# Patient Record
Sex: MALE | Race: WHITE | NOT HISPANIC OR LATINO | Employment: OTHER | URBAN - METROPOLITAN AREA
[De-identification: names, ages, dates, MRNs, and addresses within clinical notes are randomized per-mention and may not be internally consistent; named-entity substitution may affect disease eponyms.]

---

## 2023-04-18 DIAGNOSIS — K21.9 GASTROESOPHAGEAL REFLUX DISEASE WITHOUT ESOPHAGITIS: ICD-10-CM

## 2023-04-18 RX ORDER — ESOMEPRAZOLE MAGNESIUM 40 MG/1
CAPSULE, DELAYED RELEASE ORAL
Qty: 90 CAPSULE | Refills: 0 | Status: SHIPPED | OUTPATIENT
Start: 2023-04-18 | End: 2023-07-18

## 2023-04-18 RX ORDER — ESOMEPRAZOLE MAGNESIUM 40 MG/1
40 CAPSULE, DELAYED RELEASE ORAL DAILY
COMMUNITY
End: 2023-04-18 | Stop reason: SDUPTHER

## 2023-04-18 RX ORDER — ESOMEPRAZOLE MAGNESIUM 40 MG/1
40 CAPSULE, DELAYED RELEASE ORAL DAILY
Qty: 90 CAPSULE | Refills: 0 | Status: SHIPPED | OUTPATIENT
Start: 2023-04-18 | End: 2023-04-18

## 2023-05-09 ENCOUNTER — OFFICE VISIT (OUTPATIENT)
Dept: PRIMARY CARE | Facility: CLINIC | Age: 63
End: 2023-05-09
Payer: COMMERCIAL

## 2023-05-09 VITALS
BODY MASS INDEX: 36.73 KG/M2 | SYSTOLIC BLOOD PRESSURE: 130 MMHG | HEIGHT: 75 IN | TEMPERATURE: 97.9 F | RESPIRATION RATE: 16 BRPM | DIASTOLIC BLOOD PRESSURE: 74 MMHG | HEART RATE: 76 BPM | WEIGHT: 295.4 LBS

## 2023-05-09 DIAGNOSIS — Z12.5 PROSTATE CANCER SCREENING: ICD-10-CM

## 2023-05-09 DIAGNOSIS — I10 BENIGN ESSENTIAL HYPERTENSION: ICD-10-CM

## 2023-05-09 DIAGNOSIS — Z00.00 HEALTHCARE MAINTENANCE: Primary | ICD-10-CM

## 2023-05-09 DIAGNOSIS — E78.5 HYPERLIPIDEMIA, UNSPECIFIED HYPERLIPIDEMIA TYPE: ICD-10-CM

## 2023-05-09 DIAGNOSIS — Z00.00 ROUTINE GENERAL MEDICAL EXAMINATION AT A HEALTH CARE FACILITY: ICD-10-CM

## 2023-05-09 PROBLEM — M51.9 LUMBAR DISC DISEASE: Status: ACTIVE | Noted: 2023-05-09

## 2023-05-09 PROBLEM — K21.9 GERD (GASTROESOPHAGEAL REFLUX DISEASE): Status: ACTIVE | Noted: 2023-05-09

## 2023-05-09 PROBLEM — I82.431 ACUTE DEEP VEIN THROMBOSIS (DVT) OF POPLITEAL VEIN OF RIGHT LOWER EXTREMITY (MULTI): Status: ACTIVE | Noted: 2023-05-09

## 2023-05-09 PROBLEM — D36.9 TUBULAR ADENOMA: Status: ACTIVE | Noted: 2023-05-09

## 2023-05-09 PROBLEM — K76.0 FATTY LIVER: Status: ACTIVE | Noted: 2023-05-09

## 2023-05-09 PROBLEM — K27.9 PUD (PEPTIC ULCER DISEASE): Status: ACTIVE | Noted: 2023-05-09

## 2023-05-09 PROCEDURE — 3075F SYST BP GE 130 - 139MM HG: CPT | Performed by: FAMILY MEDICINE

## 2023-05-09 PROCEDURE — 3078F DIAST BP <80 MM HG: CPT | Performed by: FAMILY MEDICINE

## 2023-05-09 PROCEDURE — 1036F TOBACCO NON-USER: CPT | Performed by: FAMILY MEDICINE

## 2023-05-09 PROCEDURE — 99214 OFFICE O/P EST MOD 30 MIN: CPT | Performed by: FAMILY MEDICINE

## 2023-05-09 RX ORDER — LISINOPRIL 20 MG/1
20 TABLET ORAL DAILY
COMMUNITY
End: 2023-11-14 | Stop reason: SDUPTHER

## 2023-05-09 RX ORDER — ATENOLOL 50 MG/1
50 TABLET ORAL 2 TIMES DAILY
COMMUNITY
End: 2023-06-22 | Stop reason: SDUPTHER

## 2023-05-09 RX ORDER — ATORVASTATIN CALCIUM 20 MG/1
20 TABLET, FILM COATED ORAL DAILY
COMMUNITY
End: 2023-10-23 | Stop reason: SDUPTHER

## 2023-05-09 ASSESSMENT — ENCOUNTER SYMPTOMS: HYPERTENSION: 1

## 2023-05-09 NOTE — PROGRESS NOTES
"Subjective   Patient ID: Giovani Spaulding is a 62 y.o. male who presents for Hypertension (6 month HTN recheck . No concerns today ).    Hypertension  This is a chronic problem. The problem is controlled. There are no compliance problems.         Review of Systems    Objective   /74   Pulse 76   Temp 36.6 °C (97.9 °F)   Resp 16   Ht 1.905 m (6' 3\")   Wt 134 kg (295 lb 6.4 oz)   BMI 36.92 kg/m²     Physical Exam  Vitals and nursing note reviewed.   Constitutional:       General: He is not in acute distress.     Appearance: He is not ill-appearing.   HENT:      Head: Normocephalic and atraumatic.      Mouth/Throat:      Mouth: Mucous membranes are moist.   Eyes:      Conjunctiva/sclera: Conjunctivae normal.   Cardiovascular:      Rate and Rhythm: Normal rate and regular rhythm.      Heart sounds: Normal heart sounds.   Pulmonary:      Effort: Pulmonary effort is normal.      Breath sounds: Normal breath sounds.   Skin:     General: Skin is warm.   Neurological:      Mental Status: He is alert.   Psychiatric:         Mood and Affect: Mood normal.         Thought Content: Thought content normal.         Judgment: Judgment normal.         Assessment/Plan   Problem List Items Addressed This Visit          Circulatory    Benign essential hypertension    Relevant Orders    CBC    Comprehensive Metabolic Panel       Other    Hyperlipidemia    Relevant Orders    Lipid Panel     Other Visit Diagnoses       Healthcare maintenance    -  Primary    Relevant Orders    CT cardiac scoring wo IV contrast    Prostate cancer screening        Relevant Orders    Prostate Specific Antigen    Routine general medical examination at a health care facility        Relevant Orders    Follow Up In [facility] - PCP               "

## 2023-06-22 DIAGNOSIS — I10 BENIGN ESSENTIAL HYPERTENSION: Primary | ICD-10-CM

## 2023-06-23 RX ORDER — ATENOLOL 50 MG/1
50 TABLET ORAL 2 TIMES DAILY
Qty: 180 TABLET | Refills: 0 | Status: SHIPPED | OUTPATIENT
Start: 2023-06-23 | End: 2023-09-19

## 2023-07-18 DIAGNOSIS — K21.9 GASTROESOPHAGEAL REFLUX DISEASE WITHOUT ESOPHAGITIS: ICD-10-CM

## 2023-07-18 RX ORDER — ESOMEPRAZOLE MAGNESIUM 40 MG/1
CAPSULE, DELAYED RELEASE ORAL
Qty: 90 CAPSULE | Refills: 1 | Status: SHIPPED | OUTPATIENT
Start: 2023-07-18 | End: 2024-01-24

## 2023-09-19 DIAGNOSIS — I10 BENIGN ESSENTIAL HYPERTENSION: ICD-10-CM

## 2023-09-19 RX ORDER — ATENOLOL 50 MG/1
50 TABLET ORAL 2 TIMES DAILY
Qty: 180 TABLET | Refills: 0 | Status: SHIPPED | OUTPATIENT
Start: 2023-09-19 | End: 2023-12-18

## 2023-10-23 DIAGNOSIS — E78.5 HYPERLIPIDEMIA, UNSPECIFIED HYPERLIPIDEMIA TYPE: Primary | ICD-10-CM

## 2023-10-23 RX ORDER — ATORVASTATIN CALCIUM 20 MG/1
20 TABLET, FILM COATED ORAL DAILY
Qty: 90 TABLET | Refills: 1 | Status: SHIPPED | OUTPATIENT
Start: 2023-10-23 | End: 2024-04-18

## 2023-11-14 DIAGNOSIS — I10 BENIGN ESSENTIAL HYPERTENSION: Primary | ICD-10-CM

## 2023-11-14 RX ORDER — LISINOPRIL 20 MG/1
20 TABLET ORAL DAILY
Qty: 90 TABLET | Refills: 1 | Status: SHIPPED | OUTPATIENT
Start: 2023-11-14 | End: 2024-04-29 | Stop reason: SDUPTHER

## 2023-12-16 DIAGNOSIS — I10 BENIGN ESSENTIAL HYPERTENSION: ICD-10-CM

## 2023-12-18 RX ORDER — ATENOLOL 50 MG/1
50 TABLET ORAL 2 TIMES DAILY
Qty: 180 TABLET | Refills: 0 | Status: SHIPPED | OUTPATIENT
Start: 2023-12-18 | End: 2024-03-11

## 2024-01-24 DIAGNOSIS — K21.9 GASTROESOPHAGEAL REFLUX DISEASE WITHOUT ESOPHAGITIS: ICD-10-CM

## 2024-01-24 RX ORDER — ESOMEPRAZOLE MAGNESIUM 40 MG/1
CAPSULE, DELAYED RELEASE ORAL
Qty: 90 CAPSULE | Refills: 0 | Status: SHIPPED | OUTPATIENT
Start: 2024-01-24 | End: 2024-04-30 | Stop reason: SDUPTHER

## 2024-04-17 DIAGNOSIS — E78.5 HYPERLIPIDEMIA, UNSPECIFIED HYPERLIPIDEMIA TYPE: ICD-10-CM

## 2024-04-18 RX ORDER — ATORVASTATIN CALCIUM 20 MG/1
20 TABLET, FILM COATED ORAL DAILY
Qty: 30 TABLET | Refills: 0 | Status: SHIPPED | OUTPATIENT
Start: 2024-04-18 | End: 2024-04-30 | Stop reason: SDUPTHER

## 2024-04-29 DIAGNOSIS — I10 BENIGN ESSENTIAL HYPERTENSION: ICD-10-CM

## 2024-04-29 RX ORDER — LISINOPRIL 20 MG/1
20 TABLET ORAL DAILY
Qty: 90 TABLET | Refills: 1 | OUTPATIENT
Start: 2024-04-29

## 2024-04-29 RX ORDER — LISINOPRIL 20 MG/1
20 TABLET ORAL DAILY
Qty: 30 TABLET | Refills: 0 | Status: SHIPPED | OUTPATIENT
Start: 2024-04-29 | End: 2024-04-30 | Stop reason: SDUPTHER

## 2024-04-30 ENCOUNTER — OFFICE VISIT (OUTPATIENT)
Dept: PRIMARY CARE | Facility: CLINIC | Age: 64
End: 2024-04-30
Payer: MEDICARE

## 2024-04-30 VITALS
BODY MASS INDEX: 36.8 KG/M2 | DIASTOLIC BLOOD PRESSURE: 64 MMHG | WEIGHT: 296 LBS | RESPIRATION RATE: 18 BRPM | HEIGHT: 75 IN | SYSTOLIC BLOOD PRESSURE: 142 MMHG | OXYGEN SATURATION: 95 % | HEART RATE: 61 BPM

## 2024-04-30 DIAGNOSIS — Z00.00 ANNUAL PHYSICAL EXAM: ICD-10-CM

## 2024-04-30 DIAGNOSIS — K21.9 GASTROESOPHAGEAL REFLUX DISEASE WITHOUT ESOPHAGITIS: ICD-10-CM

## 2024-04-30 DIAGNOSIS — I10 BENIGN ESSENTIAL HYPERTENSION: ICD-10-CM

## 2024-04-30 DIAGNOSIS — E78.5 HYPERLIPIDEMIA, UNSPECIFIED HYPERLIPIDEMIA TYPE: ICD-10-CM

## 2024-04-30 DIAGNOSIS — Z12.5 ENCOUNTER FOR SCREENING FOR MALIGNANT NEOPLASM OF PROSTATE: Primary | ICD-10-CM

## 2024-04-30 PROCEDURE — 3077F SYST BP >= 140 MM HG: CPT | Performed by: NURSE PRACTITIONER

## 2024-04-30 PROCEDURE — 99396 PREV VISIT EST AGE 40-64: CPT | Performed by: NURSE PRACTITIONER

## 2024-04-30 PROCEDURE — 3078F DIAST BP <80 MM HG: CPT | Performed by: NURSE PRACTITIONER

## 2024-04-30 RX ORDER — ATENOLOL 50 MG/1
50 TABLET ORAL 2 TIMES DAILY
Qty: 180 TABLET | Refills: 11 | Status: SHIPPED | OUTPATIENT
Start: 2024-04-30

## 2024-04-30 RX ORDER — LISINOPRIL 20 MG/1
20 TABLET ORAL DAILY
Qty: 30 TABLET | Refills: 11 | Status: SHIPPED | OUTPATIENT
Start: 2024-04-30

## 2024-04-30 RX ORDER — ATORVASTATIN CALCIUM 20 MG/1
20 TABLET, FILM COATED ORAL DAILY
Qty: 30 TABLET | Refills: 11 | Status: SHIPPED | OUTPATIENT
Start: 2024-04-30 | End: 2025-04-30

## 2024-04-30 RX ORDER — ESOMEPRAZOLE MAGNESIUM 40 MG/1
40 CAPSULE, DELAYED RELEASE ORAL DAILY
Qty: 90 CAPSULE | Refills: 3 | Status: SHIPPED | OUTPATIENT
Start: 2024-04-30

## 2024-04-30 RX ORDER — COLCHICINE 0.6 MG/1
0.6 TABLET ORAL
COMMUNITY
Start: 2023-08-22 | End: 2024-04-30 | Stop reason: WASHOUT

## 2024-04-30 RX ORDER — INDOMETHACIN 50 MG/1
50 CAPSULE ORAL 3 TIMES DAILY
COMMUNITY
Start: 2023-08-21 | End: 2024-04-30 | Stop reason: ALTCHOICE

## 2024-04-30 ASSESSMENT — ENCOUNTER SYMPTOMS
NERVOUS/ANXIOUS: 0
FEVER: 0
NAUSEA: 0
CONSTIPATION: 0
FATIGUE: 0
WEAKNESS: 0
AGITATION: 0
SHORTNESS OF BREATH: 0
COUGH: 0
SLEEP DISTURBANCE: 0
DIARRHEA: 0
VOMITING: 0

## 2024-04-30 NOTE — PROGRESS NOTES
"Subjective   Patient ID: Giovani Spaulding is a 63 y.o. male who presents for New Patient Visit.    Her to establish care, medication refills and yearly exam, he is former patient of Dr Myrick. He is feeling well and up to date on his colonoscopy, he did not get blood work last year and is due for some this year. His BP is controlled on current medication.  He can follow up 1 yr or as needed         Review of Systems   Constitutional:  Negative for fatigue and fever.   Respiratory:  Negative for cough and shortness of breath.    Cardiovascular:  Negative for chest pain and leg swelling.   Gastrointestinal:  Negative for constipation, diarrhea, nausea and vomiting.   Skin:  Negative for pallor and rash.   Neurological:  Negative for weakness.   Psychiatric/Behavioral:  Negative for agitation, behavioral problems and sleep disturbance. The patient is not nervous/anxious.        Objective   /64   Pulse 61   Resp 18   Ht 1.905 m (6' 3\")   Wt 134 kg (296 lb)   SpO2 95%   BMI 37.00 kg/m²     Physical Exam  Vitals and nursing note reviewed.   Constitutional:       General: He is not in acute distress.  HENT:      Head: Normocephalic and atraumatic.   Eyes:      Pupils: Pupils are equal, round, and reactive to light.   Cardiovascular:      Rate and Rhythm: Regular rhythm. Bradycardia present.   Pulmonary:      Effort: Pulmonary effort is normal.      Breath sounds: Normal breath sounds.   Skin:     General: Skin is warm and dry.   Neurological:      Mental Status: He is alert and oriented to person, place, and time.   Psychiatric:         Mood and Affect: Mood normal.         Assessment/Plan   Problem List Items Addressed This Visit             ICD-10-CM    Benign essential hypertension I10    Relevant Medications    atenolol (Tenormin) 50 mg tablet    lisinopril 20 mg tablet    Other Relevant Orders    CBC    Comprehensive Metabolic Panel    Hemoglobin A1C    Lipid Panel    GERD (gastroesophageal reflux " disease) K21.9    Relevant Medications    esomeprazole (NexIUM) 40 mg DR capsule    Hyperlipidemia E78.5    Relevant Medications    atorvastatin (Lipitor) 20 mg tablet    Other Relevant Orders    Lipid Panel     Other Visit Diagnoses         Codes    Encounter for screening for malignant neoplasm of prostate    -  Primary Z12.5    Relevant Orders    Prostate Specific Antigen    Annual physical exam     Z00.00

## 2024-06-04 ENCOUNTER — APPOINTMENT (OUTPATIENT)
Dept: PRIMARY CARE | Facility: CLINIC | Age: 64
End: 2024-06-04
Payer: MEDICARE

## 2024-06-07 ENCOUNTER — TELEPHONE (OUTPATIENT)
Dept: SCHEDULING | Age: 64
End: 2024-06-07
Payer: MEDICARE

## 2024-06-07 NOTE — TELEPHONE ENCOUNTER
Patient left a voicemail requesting an earlier appointment than June 26th due to recent significant concerns with sleep apnea.  Patient stated he is waking up several times a night unable to breath.    Informed patient he would get a call if there are any cancellations.

## 2024-06-11 ENCOUNTER — LAB (OUTPATIENT)
Dept: LAB | Facility: LAB | Age: 64
End: 2024-06-11
Payer: MEDICARE

## 2024-06-11 DIAGNOSIS — E78.5 HYPERLIPIDEMIA, UNSPECIFIED HYPERLIPIDEMIA TYPE: ICD-10-CM

## 2024-06-11 DIAGNOSIS — Z11.4 ENCOUNTER FOR SCREENING FOR HIV: ICD-10-CM

## 2024-06-11 DIAGNOSIS — I10 BENIGN ESSENTIAL HYPERTENSION: ICD-10-CM

## 2024-06-11 DIAGNOSIS — Z12.5 ENCOUNTER FOR SCREENING FOR MALIGNANT NEOPLASM OF PROSTATE: ICD-10-CM

## 2024-06-11 LAB
ALBUMIN SERPL BCP-MCNC: 4 G/DL (ref 3.4–5)
ALP SERPL-CCNC: 54 U/L (ref 33–136)
ALT SERPL W P-5'-P-CCNC: 52 U/L (ref 10–52)
ANION GAP SERPL CALC-SCNC: 11 MMOL/L (ref 10–20)
AST SERPL W P-5'-P-CCNC: 28 U/L (ref 9–39)
BILIRUB SERPL-MCNC: 0.9 MG/DL (ref 0–1.2)
BUN SERPL-MCNC: 11 MG/DL (ref 6–23)
CALCIUM SERPL-MCNC: 8.7 MG/DL (ref 8.6–10.3)
CHLORIDE SERPL-SCNC: 106 MMOL/L (ref 98–107)
CHOLEST SERPL-MCNC: 115 MG/DL (ref 0–199)
CHOLESTEROL/HDL RATIO: 2.8
CO2 SERPL-SCNC: 29 MMOL/L (ref 21–32)
CREAT SERPL-MCNC: 0.96 MG/DL (ref 0.5–1.3)
EGFRCR SERPLBLD CKD-EPI 2021: 89 ML/MIN/1.73M*2
ERYTHROCYTE [DISTWIDTH] IN BLOOD BY AUTOMATED COUNT: 13.5 % (ref 11.5–14.5)
EST. AVERAGE GLUCOSE BLD GHB EST-MCNC: 117 MG/DL
GLUCOSE SERPL-MCNC: 104 MG/DL (ref 74–99)
HBA1C MFR BLD: 5.7 %
HCT VFR BLD AUTO: 42 % (ref 41–52)
HDLC SERPL-MCNC: 40.6 MG/DL
HGB BLD-MCNC: 13.8 G/DL (ref 13.5–17.5)
LDLC SERPL CALC-MCNC: 54 MG/DL
MCH RBC QN AUTO: 31.9 PG (ref 26–34)
MCHC RBC AUTO-ENTMCNC: 32.9 G/DL (ref 32–36)
MCV RBC AUTO: 97 FL (ref 80–100)
NON HDL CHOLESTEROL: 74 MG/DL (ref 0–149)
NRBC BLD-RTO: 0 /100 WBCS (ref 0–0)
PLATELET # BLD AUTO: 208 X10*3/UL (ref 150–450)
POTASSIUM SERPL-SCNC: 4.5 MMOL/L (ref 3.5–5.3)
PROT SERPL-MCNC: 6.3 G/DL (ref 6.4–8.2)
PSA SERPL-MCNC: 0.41 NG/ML
RBC # BLD AUTO: 4.33 X10*6/UL (ref 4.5–5.9)
SODIUM SERPL-SCNC: 141 MMOL/L (ref 136–145)
TRIGL SERPL-MCNC: 100 MG/DL (ref 0–149)
VLDL: 20 MG/DL (ref 0–40)
WBC # BLD AUTO: 9.9 X10*3/UL (ref 4.4–11.3)

## 2024-06-11 PROCEDURE — 87389 HIV-1 AG W/HIV-1&-2 AB AG IA: CPT

## 2024-06-11 PROCEDURE — 84153 ASSAY OF PSA TOTAL: CPT

## 2024-06-11 PROCEDURE — 80053 COMPREHEN METABOLIC PANEL: CPT

## 2024-06-11 PROCEDURE — 85027 COMPLETE CBC AUTOMATED: CPT

## 2024-06-11 PROCEDURE — 80061 LIPID PANEL: CPT

## 2024-06-11 PROCEDURE — 83036 HEMOGLOBIN GLYCOSYLATED A1C: CPT

## 2024-06-11 PROCEDURE — 36415 COLL VENOUS BLD VENIPUNCTURE: CPT

## 2024-06-12 ENCOUNTER — TELEPHONE (OUTPATIENT)
Dept: PRIMARY CARE | Facility: CLINIC | Age: 64
End: 2024-06-12
Payer: MEDICARE

## 2024-06-12 NOTE — RESULT ENCOUNTER NOTE
Blood work all looks good, no need for any changes. Your A1C (indicator for Diabetes)is a little elevated at 5.7 should be under 5.6. Improving your diet and exercise can help

## 2024-06-12 NOTE — TELEPHONE ENCOUNTER
----- Message from IVANA Mo sent at 6/12/2024  8:08 AM EDT -----  Blood work all looks good, no need for any changes. Your A1C (indicator for Diabetes)is a little elevated at 5.7 should be under 5.6. Improving your diet and exercise can help

## 2024-06-14 ENCOUNTER — OFFICE VISIT (OUTPATIENT)
Dept: PRIMARY CARE | Facility: CLINIC | Age: 64
End: 2024-06-14
Payer: COMMERCIAL

## 2024-06-14 VITALS
HEIGHT: 75 IN | HEART RATE: 61 BPM | WEIGHT: 301.6 LBS | SYSTOLIC BLOOD PRESSURE: 136 MMHG | TEMPERATURE: 97.6 F | BODY MASS INDEX: 37.5 KG/M2 | RESPIRATION RATE: 16 BRPM | OXYGEN SATURATION: 96 % | DIASTOLIC BLOOD PRESSURE: 82 MMHG

## 2024-06-14 DIAGNOSIS — K76.0 FATTY LIVER: ICD-10-CM

## 2024-06-14 DIAGNOSIS — E66.01 CLASS 2 SEVERE OBESITY DUE TO EXCESS CALORIES WITH SERIOUS COMORBIDITY AND BODY MASS INDEX (BMI) OF 37.0 TO 37.9 IN ADULT (MULTI): ICD-10-CM

## 2024-06-14 DIAGNOSIS — K21.9 GASTROESOPHAGEAL REFLUX DISEASE WITHOUT ESOPHAGITIS: ICD-10-CM

## 2024-06-14 DIAGNOSIS — E78.2 MIXED HYPERLIPIDEMIA: ICD-10-CM

## 2024-06-14 DIAGNOSIS — I10 BENIGN ESSENTIAL HYPERTENSION: ICD-10-CM

## 2024-06-14 DIAGNOSIS — Z11.4 ENCOUNTER FOR SCREENING FOR HIV: Primary | ICD-10-CM

## 2024-06-14 PROBLEM — E66.812 CLASS 2 SEVERE OBESITY DUE TO EXCESS CALORIES WITH SERIOUS COMORBIDITY AND BODY MASS INDEX (BMI) OF 37.0 TO 37.9 IN ADULT: Status: ACTIVE | Noted: 2024-06-14

## 2024-06-14 LAB — HIV 1+2 AB+HIV1 P24 AG SERPL QL IA: NONREACTIVE

## 2024-06-14 PROCEDURE — 3075F SYST BP GE 130 - 139MM HG: CPT | Performed by: INTERNAL MEDICINE

## 2024-06-14 PROCEDURE — 1036F TOBACCO NON-USER: CPT | Performed by: INTERNAL MEDICINE

## 2024-06-14 PROCEDURE — 99213 OFFICE O/P EST LOW 20 MIN: CPT | Performed by: INTERNAL MEDICINE

## 2024-06-14 PROCEDURE — 3008F BODY MASS INDEX DOCD: CPT | Performed by: INTERNAL MEDICINE

## 2024-06-14 PROCEDURE — 3079F DIAST BP 80-89 MM HG: CPT | Performed by: INTERNAL MEDICINE

## 2024-06-14 ASSESSMENT — PAIN SCALES - GENERAL: PAINLEVEL: 7

## 2024-06-14 NOTE — PROGRESS NOTES
"Subjective   Giovani Spaulding is a 63 y.o. male who presents for Transfer Of Care (Previous pcp was Dr. Myrick ).  Review labs       Patients previous pcp was Dr. Myrick and seen Valentine Johnson for his physical back in April 2024   Patient does check BP at home occasional, readings are normally high 130s/ 80s  Patient is having left knee pain, he is seeing an orthopedic later today for it.    Seeing spine surgery Dr. Mahoney at [facility].  Ha shad surgery x 3 for herniated discs.          Review of Systems   All other systems reviewed and are negative.      Objective   /82   Pulse 61   Temp 36.4 °C (97.6 °F)   Resp 16   Ht 1.905 m (6' 3\")   Wt 137 kg (301 lb 9.6 oz)   SpO2 96%   BMI 37.70 kg/m²       Physical Exam  Constitutional:       Appearance: Normal appearance.   HENT:      Head: Normocephalic.   Eyes:      Conjunctiva/sclera: Conjunctivae normal.   Cardiovascular:      Rate and Rhythm: Normal rate and regular rhythm.      Heart sounds: Normal heart sounds.   Pulmonary:      Effort: Pulmonary effort is normal.      Breath sounds: Normal breath sounds.   Musculoskeletal:      Cervical back: Neck supple.   Skin:     General: Skin is warm and dry.   Neurological:      Mental Status: He is alert.         Assessment/Plan   Problem List Items Addressed This Visit       Benign essential hypertension    Fatty liver     Dr. Dailey initiated with elevated liver enzymes per patient.    No documented imaging.          Relevant Orders    Follow Up In [facility] - PCP - Established    GERD (gastroesophageal reflux disease)    Hyperlipidemia    Class 2 severe obesity due to excess calories with serious comorbidity and body mass index (BMI) of 37.0 to 37.9 in adult (Multi)     I encourage you to stay active and healthy, and to follow these healthy habits:     Try to increase your intake of fish such as salmon and tuna and try to get 2 to 3 servings of fish per week.  Increase your intake of plant-based " protein listed here:    Unprocessed nuts, walnuts, or almonds, Nuts and Seeds.      Green vegetables such as Broccoli, Peas, Greens, Spinach    Beans, Chickpeas, & Lentils    Other sources include Potatoes, Quinoa, Seaweed, Soymilk, Tempeh, and Tofu.  Increase food s higher in flavonoids found in black tea, green tea, apples, nuts, citrus fruit, berries, and dark chocolate.  You should avoid fried foods, and sugary or starchy foods and sugary drinks, and void saturated fats.    Try not to dine at restaurants more than once per month, and avoid fast food restaurants.      Try to get restful sleep approximately 7-9 hours every night.  Work towards getting 30 minutes of  moderate intensity exercise 4 to 5 days per week.  You should also try to exercise at least one hour per day with light walking.            Relevant Orders    Follow Up In [facility] - PCP - Established     Other Visit Diagnoses       Encounter for screening for HIV    -  Primary    Relevant Orders    HIV 1/2 Antigen/Antibody Screen with Reflex to Confirmation          Encounter Diagnoses   Name Primary?    Encounter for screening for HIV Yes    Mixed hyperlipidemia     Benign essential hypertension     Gastroesophageal reflux disease without esophagitis     Fatty liver     Class 2 severe obesity due to excess calories with serious comorbidity and body mass index (BMI) of 37.0 to 37.9 in adult (Multi)      This is a  patient transferring care to me.  Past medical history reviewed, and discussed with patient, Medicaitons reviewed and renewed as needed, and Gaps in Care addressed as time allowed.      Johnathon Alejo, DO

## 2024-06-14 NOTE — ASSESSMENT & PLAN NOTE
I encourage you to stay active and healthy, and to follow these healthy habits:     Try to increase your intake of fish such as salmon and tuna and try to get 2 to 3 servings of fish per week.  Increase your intake of plant-based protein listed here:    Unprocessed nuts, walnuts, or almonds, Nuts and Seeds.      Green vegetables such as Broccoli, Peas, Greens, Spinach    Beans, Chickpeas, & Lentils    Other sources include Potatoes, Quinoa, Seaweed, Soymilk, Tempeh, and Tofu.  Increase food s higher in flavonoids found in black tea, green tea, apples, nuts, citrus fruit, berries, and dark chocolate.  You should avoid fried foods, and sugary or starchy foods and sugary drinks, and void saturated fats.    Try not to dine at restaurants more than once per month, and avoid fast food restaurants.      Try to get restful sleep approximately 7-9 hours every night.  Work towards getting 30 minutes of  moderate intensity exercise 4 to 5 days per week.  You should also try to exercise at least one hour per day with light walking.

## 2024-06-26 ENCOUNTER — APPOINTMENT (OUTPATIENT)
Dept: PRIMARY CARE | Facility: CLINIC | Age: 64
End: 2024-06-26
Payer: MEDICARE

## 2024-08-07 DIAGNOSIS — I10 BENIGN ESSENTIAL HYPERTENSION: ICD-10-CM

## 2024-08-07 DIAGNOSIS — E78.5 HYPERLIPIDEMIA, UNSPECIFIED HYPERLIPIDEMIA TYPE: ICD-10-CM

## 2024-08-07 RX ORDER — ATENOLOL 50 MG/1
50 TABLET ORAL 2 TIMES DAILY
Qty: 180 TABLET | Refills: 3 | Status: SHIPPED | OUTPATIENT
Start: 2024-08-07

## 2024-08-07 RX ORDER — ATORVASTATIN CALCIUM 20 MG/1
20 TABLET, FILM COATED ORAL DAILY
Qty: 90 TABLET | Refills: 3 | Status: SHIPPED | OUTPATIENT
Start: 2024-08-07 | End: 2025-08-02

## 2024-08-12 DIAGNOSIS — K21.9 GASTROESOPHAGEAL REFLUX DISEASE WITHOUT ESOPHAGITIS: ICD-10-CM

## 2024-08-12 DIAGNOSIS — I10 BENIGN ESSENTIAL HYPERTENSION: ICD-10-CM

## 2024-08-12 RX ORDER — ESOMEPRAZOLE MAGNESIUM 40 MG/1
40 CAPSULE, DELAYED RELEASE ORAL DAILY
Qty: 90 CAPSULE | Refills: 3 | Status: SHIPPED | OUTPATIENT
Start: 2024-08-12

## 2024-08-12 RX ORDER — ATENOLOL 50 MG/1
50 TABLET ORAL 2 TIMES DAILY
Qty: 180 TABLET | Refills: 3 | Status: SHIPPED | OUTPATIENT
Start: 2024-08-12

## 2024-08-12 NOTE — TELEPHONE ENCOUNTER
Approved refill request.  Please schedule follow up in the office.  
inability to maintain weight-bearing restrictions w/o assist/decreased weight-shifting ability

## 2024-09-16 ENCOUNTER — APPOINTMENT (OUTPATIENT)
Dept: PRIMARY CARE | Facility: CLINIC | Age: 64
End: 2024-09-16
Payer: MEDICARE

## 2024-09-16 VITALS
DIASTOLIC BLOOD PRESSURE: 100 MMHG | HEART RATE: 60 BPM | WEIGHT: 299 LBS | HEIGHT: 75 IN | OXYGEN SATURATION: 96 % | SYSTOLIC BLOOD PRESSURE: 162 MMHG | BODY MASS INDEX: 37.18 KG/M2 | RESPIRATION RATE: 16 BRPM | TEMPERATURE: 97.6 F

## 2024-09-16 DIAGNOSIS — Z23 NEED FOR INFLUENZA VACCINATION: ICD-10-CM

## 2024-09-16 DIAGNOSIS — I10 BENIGN ESSENTIAL HYPERTENSION: Primary | ICD-10-CM

## 2024-09-16 DIAGNOSIS — E66.01 CLASS 2 SEVERE OBESITY DUE TO EXCESS CALORIES WITH SERIOUS COMORBIDITY AND BODY MASS INDEX (BMI) OF 37.0 TO 37.9 IN ADULT: ICD-10-CM

## 2024-09-16 DIAGNOSIS — Z86.718 HISTORY OF DVT IN ADULTHOOD: ICD-10-CM

## 2024-09-16 DIAGNOSIS — Z00.00 WELL ADULT HEALTH CHECK: ICD-10-CM

## 2024-09-16 DIAGNOSIS — E78.2 MIXED HYPERLIPIDEMIA: ICD-10-CM

## 2024-09-16 PROCEDURE — 1036F TOBACCO NON-USER: CPT | Performed by: INTERNAL MEDICINE

## 2024-09-16 PROCEDURE — 3080F DIAST BP >= 90 MM HG: CPT | Performed by: INTERNAL MEDICINE

## 2024-09-16 PROCEDURE — 90656 IIV3 VACC NO PRSV 0.5 ML IM: CPT | Performed by: INTERNAL MEDICINE

## 2024-09-16 PROCEDURE — 3008F BODY MASS INDEX DOCD: CPT | Performed by: INTERNAL MEDICINE

## 2024-09-16 PROCEDURE — 90471 IMMUNIZATION ADMIN: CPT | Performed by: INTERNAL MEDICINE

## 2024-09-16 PROCEDURE — 3077F SYST BP >= 140 MM HG: CPT | Performed by: INTERNAL MEDICINE

## 2024-09-16 PROCEDURE — 99213 OFFICE O/P EST LOW 20 MIN: CPT | Performed by: INTERNAL MEDICINE

## 2024-09-16 RX ORDER — LISINOPRIL 40 MG/1
40 TABLET ORAL DAILY
Qty: 90 TABLET | Refills: 3 | Status: SHIPPED | OUTPATIENT
Start: 2024-09-16 | End: 2025-09-16

## 2024-09-16 ASSESSMENT — PAIN SCALES - GENERAL: PAINLEVEL: 0-NO PAIN

## 2024-09-16 ASSESSMENT — ENCOUNTER SYMPTOMS: HYPERTENSION: 1

## 2024-09-16 NOTE — PROGRESS NOTES
"Subjective   Giovani Spaulding is a 63 y.o. male who presents for Follow-up and Hypertension.    [facility] 9.14.2024   Patient would like his yearly flu vaccine   Patient occasionally checks BP at home and readings are around 135-148/70-80s      Hypertension  This is a recurrent problem. The current episode started more than 1 year ago. The problem is unchanged. There are no associated agents to hypertension. Risk factors for coronary artery disease include male gender. The current treatment provides mild improvement. There are no compliance problems.        Review of Systems   All other systems reviewed and are negative.      Objective   BP (!) 162/100 (BP Location: Right arm, Patient Position: Sitting)   Pulse 60   Temp 36.4 °C (97.6 °F)   Resp 16   Ht 1.905 m (6' 3\")   Wt 136 kg (299 lb)   SpO2 96%   BMI 37.37 kg/m²       Physical Exam  Constitutional:       Appearance: Normal appearance.   HENT:      Head: Normocephalic.   Eyes:      Conjunctiva/sclera: Conjunctivae normal.   Cardiovascular:      Rate and Rhythm: Normal rate and regular rhythm.      Heart sounds: Normal heart sounds.   Pulmonary:      Effort: Pulmonary effort is normal.      Breath sounds: Normal breath sounds.   Musculoskeletal:      Cervical back: Neck supple.   Skin:     General: Skin is warm and dry.   Neurological:      Mental Status: He is alert.       Assessment & Plan  Need for influenza vaccination    Orders:    Flu vaccine, trivalent, preservative free, age 6 months and greater (Fluraix/Fluzone/Flulaval)    Benign essential hypertension  Elevation today and will increase lisinopril to 40 mg daily.  Discussed signs of hypotension and to call if he has problems with medication.  Consider adding diuretic.  Orders:    lisinopril 40 mg tablet; Take 1 tablet (40 mg) by mouth once daily. Needs appointment to establish care with new primary for future refills    Basic Metabolic Panel; Future    Magnesium; Future    Class 2 severe obesity " due to excess calories with serious comorbidity and body mass index (BMI) of 37.0 to 37.9 in adult (Multi)  Discussed options with diet and exercise.  Consider option to add medication to assist and will discuss at next visit.       Mixed hyperlipidemia  Well controlled and reviewed last Lipid panel.         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  No recurrence.           Discussed influenza and COVID vvaccinations recommendations.  Follow up in 2 months or sooner should you have questions or difficulty with medication changes.      Johnathon Alejo, DO

## 2024-09-16 NOTE — ASSESSMENT & PLAN NOTE
Elevation today and will increase lisinopril to 40 mg daily.  Discussed signs of hypotension and to call if he has problems with medication.  Consider adding diuretic.  Orders:    lisinopril 40 mg tablet; Take 1 tablet (40 mg) by mouth once daily. Needs appointment to establish care with new primary for future refills    Basic Metabolic Panel; Future    Magnesium; Future

## 2024-09-16 NOTE — PATIENT INSTRUCTIONS
I encourage you to stay active and healthy, and to follow these healthy habits:     Try to increase your intake of fish such as salmon and tuna and try to get 2 to 3 servings of fish per week.  Increase your intake of plant-based protein listed here:    Unprocessed nuts, walnuts, or almonds, Nuts and Seeds.      Green vegetables such as Broccoli, Peas, Greens, Spinach    Beans, Chickpeas, & Lentils    Other sources include Potatoes, Quinoa, Seaweed, Soymilk, Tempeh, and Tofu.  Increase foods higher in flavonoids found in black tea, green tea, apples, nuts, citrus fruit, berries, and dark chocolate.  You should avoid fried foods, and sugary or starchy foods and sugary drinks, and void saturated fats.    Try not to dine at restaurants more than once per month, and avoid fast food restaurants.      Try to get restful sleep approximately 7-9 hours every night.  Work towards getting 30 minutes of  moderate intensity exercise 4 to 5 days per week.  You should also try to exercise at least one hour per day with light walking.

## 2024-09-16 NOTE — ASSESSMENT & PLAN NOTE
Discussed options with diet and exercise.  Consider option to add medication to assist and will discuss at next visit.

## 2024-11-13 ENCOUNTER — APPOINTMENT (OUTPATIENT)
Dept: PRIMARY CARE | Facility: CLINIC | Age: 64
End: 2024-11-13
Payer: MEDICARE

## 2025-01-04 ENCOUNTER — APPOINTMENT (OUTPATIENT)
Dept: CARDIOLOGY | Facility: HOSPITAL | Age: 65
DRG: 439 | End: 2025-01-04
Payer: COMMERCIAL

## 2025-01-04 ENCOUNTER — HOSPITAL ENCOUNTER (INPATIENT)
Facility: HOSPITAL | Age: 65
DRG: 439 | End: 2025-01-04
Attending: EMERGENCY MEDICINE | Admitting: STUDENT IN AN ORGANIZED HEALTH CARE EDUCATION/TRAINING PROGRAM
Payer: COMMERCIAL

## 2025-01-04 ENCOUNTER — APPOINTMENT (OUTPATIENT)
Dept: RADIOLOGY | Facility: HOSPITAL | Age: 65
DRG: 439 | End: 2025-01-04
Payer: COMMERCIAL

## 2025-01-04 DIAGNOSIS — K85.90 ACUTE PANCREATITIS, UNSPECIFIED COMPLICATION STATUS, UNSPECIFIED PANCREATITIS TYPE (HHS-HCC): Primary | ICD-10-CM

## 2025-01-04 LAB
ALBUMIN SERPL BCP-MCNC: 4 G/DL (ref 3.4–5)
ALP SERPL-CCNC: 53 U/L (ref 33–136)
ALT SERPL W P-5'-P-CCNC: 27 U/L (ref 10–52)
ANION GAP SERPL CALC-SCNC: 9 MMOL/L (ref 10–20)
APPEARANCE UR: CLEAR
AST SERPL W P-5'-P-CCNC: 19 U/L (ref 9–39)
BASOPHILS # BLD AUTO: 0.05 X10*3/UL (ref 0–0.1)
BASOPHILS NFR BLD AUTO: 0.4 %
BILIRUB SERPL-MCNC: 0.7 MG/DL (ref 0–1.2)
BILIRUB UR STRIP.AUTO-MCNC: NEGATIVE MG/DL
BUN SERPL-MCNC: 7 MG/DL (ref 6–23)
CALCIUM SERPL-MCNC: 9.3 MG/DL (ref 8.6–10.3)
CHLORIDE SERPL-SCNC: 98 MMOL/L (ref 98–107)
CHOLEST SERPL-MCNC: 135 MG/DL (ref 0–199)
CHOLESTEROL/HDL RATIO: 3
CO2 SERPL-SCNC: 30 MMOL/L (ref 21–32)
COLOR UR: COLORLESS
CREAT SERPL-MCNC: 0.85 MG/DL (ref 0.5–1.3)
EGFRCR SERPLBLD CKD-EPI 2021: >90 ML/MIN/1.73M*2
EOSINOPHIL # BLD AUTO: 0.31 X10*3/UL (ref 0–0.7)
EOSINOPHIL NFR BLD AUTO: 2.3 %
ERYTHROCYTE [DISTWIDTH] IN BLOOD BY AUTOMATED COUNT: 12.4 % (ref 11.5–14.5)
FLUAV RNA RESP QL NAA+PROBE: NOT DETECTED
FLUBV RNA RESP QL NAA+PROBE: NOT DETECTED
GLUCOSE SERPL-MCNC: 150 MG/DL (ref 74–99)
GLUCOSE UR STRIP.AUTO-MCNC: NORMAL MG/DL
HCT VFR BLD AUTO: 45.3 % (ref 41–52)
HDLC SERPL-MCNC: 45.2 MG/DL
HGB BLD-MCNC: 15.3 G/DL (ref 13.5–17.5)
IMM GRANULOCYTES # BLD AUTO: 0.04 X10*3/UL (ref 0–0.7)
IMM GRANULOCYTES NFR BLD AUTO: 0.3 % (ref 0–0.9)
KETONES UR STRIP.AUTO-MCNC: NEGATIVE MG/DL
LEUKOCYTE ESTERASE UR QL STRIP.AUTO: NEGATIVE
LIPASE SERPL-CCNC: 1321 U/L (ref 9–82)
LYMPHOCYTES # BLD AUTO: 1.42 X10*3/UL (ref 1.2–4.8)
LYMPHOCYTES NFR BLD AUTO: 10.3 %
MCH RBC QN AUTO: 31.3 PG (ref 26–34)
MCHC RBC AUTO-ENTMCNC: 33.8 G/DL (ref 32–36)
MCV RBC AUTO: 93 FL (ref 80–100)
MONOCYTES # BLD AUTO: 1.21 X10*3/UL (ref 0.1–1)
MONOCYTES NFR BLD AUTO: 8.8 %
NEUTROPHILS # BLD AUTO: 10.73 X10*3/UL (ref 1.2–7.7)
NEUTROPHILS NFR BLD AUTO: 77.9 %
NITRITE UR QL STRIP.AUTO: NEGATIVE
NON-HDL CHOLESTEROL: 90 MG/DL (ref 0–149)
NRBC BLD-RTO: 0 /100 WBCS (ref 0–0)
PH UR STRIP.AUTO: 7 [PH]
PLATELET # BLD AUTO: 263 X10*3/UL (ref 150–450)
POTASSIUM SERPL-SCNC: 3.9 MMOL/L (ref 3.5–5.3)
PROT SERPL-MCNC: 7.4 G/DL (ref 6.4–8.2)
PROT UR STRIP.AUTO-MCNC: NEGATIVE MG/DL
RBC # BLD AUTO: 4.89 X10*6/UL (ref 4.5–5.9)
RBC # UR STRIP.AUTO: NEGATIVE /UL
SARS-COV-2 RNA RESP QL NAA+PROBE: NOT DETECTED
SODIUM SERPL-SCNC: 133 MMOL/L (ref 136–145)
SP GR UR STRIP.AUTO: 1.04
UROBILINOGEN UR STRIP.AUTO-MCNC: NORMAL MG/DL
WBC # BLD AUTO: 13.8 X10*3/UL (ref 4.4–11.3)

## 2025-01-04 PROCEDURE — 96361 HYDRATE IV INFUSION ADD-ON: CPT

## 2025-01-04 PROCEDURE — 36415 COLL VENOUS BLD VENIPUNCTURE: CPT

## 2025-01-04 PROCEDURE — 82787 IGG 1 2 3 OR 4 EACH: CPT | Mod: STJLAB

## 2025-01-04 PROCEDURE — 99223 1ST HOSP IP/OBS HIGH 75: CPT

## 2025-01-04 PROCEDURE — 83690 ASSAY OF LIPASE: CPT | Performed by: EMERGENCY MEDICINE

## 2025-01-04 PROCEDURE — 87040 BLOOD CULTURE FOR BACTERIA: CPT | Mod: STJLAB

## 2025-01-04 PROCEDURE — 2500000004 HC RX 250 GENERAL PHARMACY W/ HCPCS (ALT 636 FOR OP/ED)

## 2025-01-04 PROCEDURE — 99285 EMERGENCY DEPT VISIT HI MDM: CPT | Mod: 25 | Performed by: EMERGENCY MEDICINE

## 2025-01-04 PROCEDURE — 87636 SARSCOV2 & INF A&B AMP PRB: CPT

## 2025-01-04 PROCEDURE — 2550000001 HC RX 255 CONTRASTS: Performed by: EMERGENCY MEDICINE

## 2025-01-04 PROCEDURE — 83036 HEMOGLOBIN GLYCOSYLATED A1C: CPT | Mod: STJLAB

## 2025-01-04 PROCEDURE — 74177 CT ABD & PELVIS W/CONTRAST: CPT | Performed by: RADIOLOGY

## 2025-01-04 PROCEDURE — 96375 TX/PRO/DX INJ NEW DRUG ADDON: CPT

## 2025-01-04 PROCEDURE — 85025 COMPLETE CBC W/AUTO DIFF WBC: CPT

## 2025-01-04 PROCEDURE — 2500000004 HC RX 250 GENERAL PHARMACY W/ HCPCS (ALT 636 FOR OP/ED): Performed by: EMERGENCY MEDICINE

## 2025-01-04 PROCEDURE — 83718 ASSAY OF LIPOPROTEIN: CPT

## 2025-01-04 PROCEDURE — 81003 URINALYSIS AUTO W/O SCOPE: CPT

## 2025-01-04 PROCEDURE — 93005 ELECTROCARDIOGRAM TRACING: CPT

## 2025-01-04 PROCEDURE — 80053 COMPREHEN METABOLIC PANEL: CPT

## 2025-01-04 PROCEDURE — 74177 CT ABD & PELVIS W/CONTRAST: CPT

## 2025-01-04 RX ORDER — ONDANSETRON HYDROCHLORIDE 2 MG/ML
4 INJECTION, SOLUTION INTRAVENOUS ONCE
Status: COMPLETED | OUTPATIENT
Start: 2025-01-04 | End: 2025-01-04

## 2025-01-04 RX ORDER — METRONIDAZOLE 500 MG/100ML
500 INJECTION, SOLUTION INTRAVENOUS EVERY 8 HOURS
Status: DISCONTINUED | OUTPATIENT
Start: 2025-01-04 | End: 2025-01-06

## 2025-01-04 RX ORDER — CIPROFLOXACIN 2 MG/ML
400 INJECTION, SOLUTION INTRAVENOUS EVERY 12 HOURS
Status: DISCONTINUED | OUTPATIENT
Start: 2025-01-04 | End: 2025-01-06

## 2025-01-04 RX ORDER — DIPHENHYDRAMINE HYDROCHLORIDE 50 MG/ML
INJECTION INTRAMUSCULAR; INTRAVENOUS
Status: COMPLETED
Start: 2025-01-04 | End: 2025-01-04

## 2025-01-04 RX ORDER — MORPHINE SULFATE 4 MG/ML
4 INJECTION, SOLUTION INTRAMUSCULAR; INTRAVENOUS ONCE
Status: COMPLETED | OUTPATIENT
Start: 2025-01-04 | End: 2025-01-04

## 2025-01-04 RX ORDER — FAMOTIDINE 10 MG/ML
INJECTION INTRAVENOUS
Status: COMPLETED
Start: 2025-01-04 | End: 2025-01-04

## 2025-01-04 RX ORDER — FAMOTIDINE 10 MG/ML
20 INJECTION INTRAVENOUS ONCE
Status: COMPLETED | OUTPATIENT
Start: 2025-01-04 | End: 2025-01-04

## 2025-01-04 RX ORDER — SODIUM CHLORIDE, SODIUM LACTATE, POTASSIUM CHLORIDE, CALCIUM CHLORIDE 600; 310; 30; 20 MG/100ML; MG/100ML; MG/100ML; MG/100ML
100 INJECTION, SOLUTION INTRAVENOUS CONTINUOUS
Status: ACTIVE | OUTPATIENT
Start: 2025-01-04 | End: 2025-01-06

## 2025-01-04 RX ORDER — ENOXAPARIN SODIUM 100 MG/ML
40 INJECTION SUBCUTANEOUS EVERY 24 HOURS
Status: DISCONTINUED | OUTPATIENT
Start: 2025-01-04 | End: 2025-01-06 | Stop reason: HOSPADM

## 2025-01-04 RX ORDER — DIPHENHYDRAMINE HYDROCHLORIDE 50 MG/ML
50 INJECTION INTRAMUSCULAR; INTRAVENOUS ONCE
Status: COMPLETED | OUTPATIENT
Start: 2025-01-04 | End: 2025-01-04

## 2025-01-04 RX ADMIN — IOHEXOL 75 ML: 350 INJECTION, SOLUTION INTRAVENOUS at 19:34

## 2025-01-04 RX ADMIN — FAMOTIDINE 20 MG: 10 INJECTION INTRAVENOUS at 19:52

## 2025-01-04 RX ADMIN — ONDANSETRON 4 MG: 2 INJECTION INTRAMUSCULAR; INTRAVENOUS at 19:11

## 2025-01-04 RX ADMIN — SODIUM CHLORIDE, POTASSIUM CHLORIDE, SODIUM LACTATE AND CALCIUM CHLORIDE 1000 ML: 600; 310; 30; 20 INJECTION, SOLUTION INTRAVENOUS at 20:32

## 2025-01-04 RX ADMIN — FAMOTIDINE 20 MG: 10 INJECTION, SOLUTION INTRAVENOUS at 19:52

## 2025-01-04 RX ADMIN — MORPHINE SULFATE 4 MG: 4 INJECTION, SOLUTION INTRAMUSCULAR; INTRAVENOUS at 19:11

## 2025-01-04 RX ADMIN — DIPHENHYDRAMINE HYDROCHLORIDE 50 MG: 50 INJECTION INTRAMUSCULAR; INTRAVENOUS at 19:51

## 2025-01-04 RX ADMIN — SODIUM CHLORIDE, POTASSIUM CHLORIDE, SODIUM LACTATE AND CALCIUM CHLORIDE 1000 ML: 600; 310; 30; 20 INJECTION, SOLUTION INTRAVENOUS at 19:07

## 2025-01-04 ASSESSMENT — PAIN DESCRIPTION - DESCRIPTORS
DESCRIPTORS: ACHING
DESCRIPTORS: ACHING

## 2025-01-04 ASSESSMENT — LIFESTYLE VARIABLES
EVER FELT BAD OR GUILTY ABOUT YOUR DRINKING: NO
HAVE YOU EVER FELT YOU SHOULD CUT DOWN ON YOUR DRINKING: NO
TOTAL SCORE: 0
HAVE PEOPLE ANNOYED YOU BY CRITICIZING YOUR DRINKING: NO
EVER HAD A DRINK FIRST THING IN THE MORNING TO STEADY YOUR NERVES TO GET RID OF A HANGOVER: NO

## 2025-01-04 ASSESSMENT — COLUMBIA-SUICIDE SEVERITY RATING SCALE - C-SSRS
2. HAVE YOU ACTUALLY HAD ANY THOUGHTS OF KILLING YOURSELF?: NO
1. IN THE PAST MONTH, HAVE YOU WISHED YOU WERE DEAD OR WISHED YOU COULD GO TO SLEEP AND NOT WAKE UP?: NO
6. HAVE YOU EVER DONE ANYTHING, STARTED TO DO ANYTHING, OR PREPARED TO DO ANYTHING TO END YOUR LIFE?: NO

## 2025-01-04 ASSESSMENT — PAIN - FUNCTIONAL ASSESSMENT: PAIN_FUNCTIONAL_ASSESSMENT: 0-10

## 2025-01-04 ASSESSMENT — PAIN DESCRIPTION - ORIENTATION: ORIENTATION: RIGHT;LOWER

## 2025-01-04 ASSESSMENT — PAIN SCALES - GENERAL
PAINLEVEL_OUTOF10: 0 - NO PAIN
PAINLEVEL_OUTOF10: 0 - NO PAIN
PAINLEVEL_OUTOF10: 6

## 2025-01-04 ASSESSMENT — PAIN DESCRIPTION - PROGRESSION: CLINICAL_PROGRESSION: NOT CHANGED

## 2025-01-04 ASSESSMENT — PAIN DESCRIPTION - LOCATION: LOCATION: ABDOMEN

## 2025-01-04 ASSESSMENT — PAIN DESCRIPTION - PAIN TYPE: TYPE: ACUTE PAIN

## 2025-01-04 ASSESSMENT — PAIN DESCRIPTION - ONSET: ONSET: ONGOING

## 2025-01-04 ASSESSMENT — PAIN DESCRIPTION - FREQUENCY: FREQUENCY: CONSTANT/CONTINUOUS

## 2025-01-04 NOTE — ED PROVIDER NOTES
HPI   Chief Complaint   Patient presents with    Abdominal Pain     RLQ since last night. Patient endorses constipation today with diarrhea yesterday and fever/chills       Patient is a 64-year-old male with history of hypertension, umbilical hernia surgery presenting to [facility] ED for right lower quadrant pain, abdominal distention, constipation, subjective fever since yesterday.  Patient reports his last bowel movement was earlier this morning.  Patient reports last passing gas last night.  Patient denies any chest pain, shortness of breath, nausea, vomiting, blood in the urine or stool, dizziness, lightheadedness, headache, or weakness.              Patient History   Past Medical History:   Diagnosis Date    Hypertension      Past Surgical History:   Procedure Laterality Date    CATARACT EXTRACTION Right     OTHER SURGICAL HISTORY  10/02/2019    Laminectomy    OTHER SURGICAL HISTORY  10/02/2019    Vasectomy     Family History   Problem Relation Name Age of Onset    No Known Problems Mother      No Known Problems Father       Social History     Tobacco Use    Smoking status: Never    Smokeless tobacco: Never   Substance Use Topics    Alcohol use: Not Currently     Comment: Recently quit drinking 5/26/2024    Drug use: Never       Physical Exam   ED Triage Vitals [01/04/25 1828]   Temperature Heart Rate Respirations BP   37.7 °C (99.9 °F) 95 18 (!) 183/98      Pulse Ox Temp Source Heart Rate Source Patient Position   95 % Temporal Monitor Sitting      BP Location FiO2 (%)     Right arm --       Physical Exam  Constitutional:       Appearance: Normal appearance. He is obese.   HENT:      Head: Normocephalic and atraumatic.      Nose: Nose normal.      Mouth/Throat:      Mouth: Mucous membranes are moist.      Pharynx: Oropharynx is clear.   Eyes:      Extraocular Movements: Extraocular movements intact.      Conjunctiva/sclera: Conjunctivae normal.      Pupils: Pupils are equal, round, and reactive to light.    Cardiovascular:      Rate and Rhythm: Normal rate and regular rhythm.      Pulses: Normal pulses.      Heart sounds: Normal heart sounds.   Pulmonary:      Effort: Pulmonary effort is normal.      Breath sounds: Normal breath sounds.   Abdominal:      General: Abdomen is flat. Bowel sounds are normal. There is distension.      Palpations: Abdomen is soft.      Tenderness: There is abdominal tenderness in the right lower quadrant.      Hernia: No hernia is present.   Musculoskeletal:         General: Normal range of motion.      Cervical back: Normal range of motion and neck supple.   Skin:     General: Skin is warm and dry.      Capillary Refill: Capillary refill takes less than 2 seconds.   Neurological:      General: No focal deficit present.      Mental Status: He is alert and oriented to person, place, and time. Mental status is at baseline.   Psychiatric:         Mood and Affect: Mood normal.         Behavior: Behavior normal.           ED Course & [facility]   ED Course as of 01/05/25 0123   Sat Jan 04, 2025 1948 I received a call from CT scan tech after the patient had his scan performed.  He appears to be having allergic reaction.  I met the patient back in the room, he is got erythematous rash and hives noted to the face.  He denies any nausea or GI upset, no difficulty swallowing, no shortness of breath.  On examination he is got hives to the face, no swelling of the tongue lips or uvula, he is tolerating secretions without difficulty, no stridor, no wheezing.  No evidence for anaphylaxis.  Will treat with Benadryl and Pepcid.  Patient states that he developed severe allergic reactions to both prednisone and Medrol Dosepak, so we will hold off on steroids right now. [JJ]   2001 Patient was reevaluated, feeling improved.  Still with rash of the face and the chest.  Still tolerating secretions without difficulty, no shortness of breath.  He states that the itching feels improved. [JJ]      ED Course User  Index  [JJ] Trevor Dela CruzDO         Diagnoses as of 01/05/25 0123   Acute pancreatitis, unspecified complication status, unspecified pancreatitis type ([facility])                 No data recorded     [place] Coma Scale Score: 15 (01/04/25 1833 : Jeanette Gill, TANK)                           Medical Decision Making  Patient is a 64 y.o. male who presents to [facility] ED for Abdominal Pain (RLQ since last night. Patient endorses constipation today with diarrhea yesterday and fever/chills). On initial ED evaluation, patient found to be in no acute distress. Per HPI, concern to evaluate and treat for differential diagnosis including, but not limited to small bowel obstruction versus ileus versus constipation.  Obtain abdominal lab workup and diagnostics, including CT abdomen pelvis IV contrast imaging.  CBC showed leukocytosis of 13.8.  CMP showed no significant ZOE or electrolyte abnormality.  Lipase was elevated at 1321.  Patient viral respiratory testing negative.  Patient UA showed no acute signs of infection.  CT abdomen pelvis with IV contrast imaging showed this finding suggestive of acute pancreatitis.  Patient given IV LR fluids.  Patient given Zofran for nausea.  Patient given morphine for pain.  Patient also given 20 mg Pepcid, Benadryl.  Patient to be admitted to medicine service for further evaluation and management of acute pancreatitis.    Diagnostic findings and treatment plan discussed with patient/family. They are amenable to plan. Patient admitted to medicine service for further evaluation and management.          Procedure  Procedures     Angel Alexis MD  Resident  01/05/25 0125

## 2025-01-05 ENCOUNTER — APPOINTMENT (OUTPATIENT)
Dept: RADIOLOGY | Facility: HOSPITAL | Age: 65
DRG: 439 | End: 2025-01-05
Payer: COMMERCIAL

## 2025-01-05 VITALS
HEART RATE: 85 BPM | WEIGHT: 294 LBS | SYSTOLIC BLOOD PRESSURE: 181 MMHG | HEIGHT: 74 IN | TEMPERATURE: 98.4 F | BODY MASS INDEX: 37.73 KG/M2 | DIASTOLIC BLOOD PRESSURE: 99 MMHG | RESPIRATION RATE: 20 BRPM | OXYGEN SATURATION: 93 %

## 2025-01-05 LAB
ALBUMIN SERPL BCP-MCNC: 3.6 G/DL (ref 3.4–5)
ALP SERPL-CCNC: 44 U/L (ref 33–136)
ALT SERPL W P-5'-P-CCNC: 20 U/L (ref 10–52)
ANION GAP SERPL CALC-SCNC: 10 MMOL/L (ref 10–20)
AST SERPL W P-5'-P-CCNC: 14 U/L (ref 9–39)
ATRIAL RATE: 81 BPM
BACTERIA BLD CULT: NORMAL
BACTERIA BLD CULT: NORMAL
BILIRUB SERPL-MCNC: 1 MG/DL (ref 0–1.2)
BUN SERPL-MCNC: 5 MG/DL (ref 6–23)
CALCIUM SERPL-MCNC: 8.6 MG/DL (ref 8.6–10.3)
CARDIAC TROPONIN I PNL SERPL HS: 5 NG/L (ref 0–20)
CHLORIDE SERPL-SCNC: 102 MMOL/L (ref 98–107)
CO2 SERPL-SCNC: 26 MMOL/L (ref 21–32)
CREAT SERPL-MCNC: 0.81 MG/DL (ref 0.5–1.3)
EGFRCR SERPLBLD CKD-EPI 2021: >90 ML/MIN/1.73M*2
ERYTHROCYTE [DISTWIDTH] IN BLOOD BY AUTOMATED COUNT: 12.6 % (ref 11.5–14.5)
EST. AVERAGE GLUCOSE BLD GHB EST-MCNC: 128 MG/DL
GLUCOSE SERPL-MCNC: 120 MG/DL (ref 74–99)
HBA1C MFR BLD: 6.1 %
HCT VFR BLD AUTO: 42.1 % (ref 41–52)
HGB BLD-MCNC: 14.1 G/DL (ref 13.5–17.5)
HOLD SPECIMEN: NORMAL
IGG4 SER-MCNC: 170 MG/DL (ref 3–200)
LIPASE SERPL-CCNC: 774 U/L (ref 9–82)
MAGNESIUM SERPL-MCNC: 1.72 MG/DL (ref 1.6–2.4)
MCH RBC QN AUTO: 31 PG (ref 26–34)
MCHC RBC AUTO-ENTMCNC: 33.5 G/DL (ref 32–36)
MCV RBC AUTO: 93 FL (ref 80–100)
NRBC BLD-RTO: 0 /100 WBCS (ref 0–0)
P OFFSET: 147 MS
P ONSET: 109 MS
PHOSPHATE SERPL-MCNC: 3.5 MG/DL (ref 2.5–4.9)
PLATELET # BLD AUTO: 240 X10*3/UL (ref 150–450)
POTASSIUM SERPL-SCNC: 4 MMOL/L (ref 3.5–5.3)
PROT SERPL-MCNC: 6.5 G/DL (ref 6.4–8.2)
Q ONSET: 208 MS
QRS COUNT: 13 BEATS
QRS DURATION: 94 MS
QT INTERVAL: 410 MS
QTC CALCULATION(BAZETT): 476 MS
QTC FREDERICIA: 453 MS
R AXIS: 5 DEGREES
RBC # BLD AUTO: 4.55 X10*6/UL (ref 4.5–5.9)
SODIUM SERPL-SCNC: 134 MMOL/L (ref 136–145)
T AXIS: 7 DEGREES
T OFFSET: 413 MS
VENTRICULAR RATE: 81 BPM
WBC # BLD AUTO: 14.5 X10*3/UL (ref 4.4–11.3)

## 2025-01-05 PROCEDURE — 96372 THER/PROPH/DIAG INJ SC/IM: CPT

## 2025-01-05 PROCEDURE — 2500000004 HC RX 250 GENERAL PHARMACY W/ HCPCS (ALT 636 FOR OP/ED)

## 2025-01-05 PROCEDURE — 84484 ASSAY OF TROPONIN QUANT: CPT

## 2025-01-05 PROCEDURE — 76705 ECHO EXAM OF ABDOMEN: CPT | Mod: FOREIGN READ | Performed by: RADIOLOGY

## 2025-01-05 PROCEDURE — 96367 TX/PROPH/DG ADDL SEQ IV INF: CPT

## 2025-01-05 PROCEDURE — 96376 TX/PRO/DX INJ SAME DRUG ADON: CPT

## 2025-01-05 PROCEDURE — 84100 ASSAY OF PHOSPHORUS: CPT

## 2025-01-05 PROCEDURE — 99233 SBSQ HOSP IP/OBS HIGH 50: CPT

## 2025-01-05 PROCEDURE — 2500000001 HC RX 250 WO HCPCS SELF ADMINISTERED DRUGS (ALT 637 FOR MEDICARE OP)

## 2025-01-05 PROCEDURE — 71046 X-RAY EXAM CHEST 2 VIEWS: CPT

## 2025-01-05 PROCEDURE — 85027 COMPLETE CBC AUTOMATED: CPT

## 2025-01-05 PROCEDURE — 76705 ECHO EXAM OF ABDOMEN: CPT

## 2025-01-05 PROCEDURE — 80053 COMPREHEN METABOLIC PANEL: CPT

## 2025-01-05 PROCEDURE — 96365 THER/PROPH/DIAG IV INF INIT: CPT | Mod: 59

## 2025-01-05 PROCEDURE — 71046 X-RAY EXAM CHEST 2 VIEWS: CPT | Mod: FOREIGN READ | Performed by: RADIOLOGY

## 2025-01-05 PROCEDURE — 83690 ASSAY OF LIPASE: CPT

## 2025-01-05 PROCEDURE — 36415 COLL VENOUS BLD VENIPUNCTURE: CPT

## 2025-01-05 PROCEDURE — 83735 ASSAY OF MAGNESIUM: CPT

## 2025-01-05 PROCEDURE — 96366 THER/PROPH/DIAG IV INF ADDON: CPT

## 2025-01-05 PROCEDURE — 1200000002 HC GENERAL ROOM WITH TELEMETRY DAILY

## 2025-01-05 RX ORDER — LISINOPRIL 40 MG/1
40 TABLET ORAL DAILY
Status: DISCONTINUED | OUTPATIENT
Start: 2025-01-05 | End: 2025-01-06 | Stop reason: HOSPADM

## 2025-01-05 RX ORDER — OXYCODONE HYDROCHLORIDE 5 MG/1
5 TABLET ORAL EVERY 6 HOURS PRN
Status: DISCONTINUED | OUTPATIENT
Start: 2025-01-05 | End: 2025-01-06 | Stop reason: HOSPADM

## 2025-01-05 RX ORDER — ACETAMINOPHEN 500 MG
10 TABLET ORAL ONCE
Status: COMPLETED | OUTPATIENT
Start: 2025-01-05 | End: 2025-01-05

## 2025-01-05 RX ORDER — ATENOLOL 50 MG/1
50 TABLET ORAL 2 TIMES DAILY
Status: DISCONTINUED | OUTPATIENT
Start: 2025-01-05 | End: 2025-01-06 | Stop reason: HOSPADM

## 2025-01-05 RX ORDER — ATORVASTATIN CALCIUM 20 MG/1
20 TABLET, FILM COATED ORAL NIGHTLY
Status: DISCONTINUED | OUTPATIENT
Start: 2025-01-05 | End: 2025-01-06 | Stop reason: HOSPADM

## 2025-01-05 RX ORDER — PANTOPRAZOLE SODIUM 40 MG/1
40 TABLET, DELAYED RELEASE ORAL
Status: DISCONTINUED | OUTPATIENT
Start: 2025-01-06 | End: 2025-01-06 | Stop reason: HOSPADM

## 2025-01-05 RX ORDER — ACETAMINOPHEN 325 MG/1
650 TABLET ORAL EVERY 6 HOURS PRN
Status: DISCONTINUED | OUTPATIENT
Start: 2025-01-05 | End: 2025-01-06 | Stop reason: HOSPADM

## 2025-01-05 RX ADMIN — OXYCODONE HYDROCHLORIDE 5 MG: 5 TABLET ORAL at 23:38

## 2025-01-05 RX ADMIN — Medication 10 MG: at 02:24

## 2025-01-05 RX ADMIN — ACETAMINOPHEN 650 MG: 325 TABLET ORAL at 10:22

## 2025-01-05 RX ADMIN — ATENOLOL 50 MG: 50 TABLET ORAL at 20:34

## 2025-01-05 RX ADMIN — METRONIDAZOLE 500 MG: 500 INJECTION, SOLUTION INTRAVENOUS at 17:07

## 2025-01-05 RX ADMIN — OXYCODONE HYDROCHLORIDE 5 MG: 5 TABLET ORAL at 11:46

## 2025-01-05 RX ADMIN — SODIUM CHLORIDE, POTASSIUM CHLORIDE, SODIUM LACTATE AND CALCIUM CHLORIDE 100 ML/HR: 600; 310; 30; 20 INJECTION, SOLUTION INTRAVENOUS at 10:28

## 2025-01-05 RX ADMIN — CIPROFLOXACIN 400 MG: 400 INJECTION, SOLUTION INTRAVENOUS at 10:22

## 2025-01-05 RX ADMIN — CIPROFLOXACIN 400 MG: 400 INJECTION, SOLUTION INTRAVENOUS at 22:36

## 2025-01-05 RX ADMIN — ENOXAPARIN SODIUM 40 MG: 40 INJECTION SUBCUTANEOUS at 00:06

## 2025-01-05 RX ADMIN — SODIUM CHLORIDE, POTASSIUM CHLORIDE, SODIUM LACTATE AND CALCIUM CHLORIDE 100 ML/HR: 600; 310; 30; 20 INJECTION, SOLUTION INTRAVENOUS at 00:02

## 2025-01-05 RX ADMIN — ENOXAPARIN SODIUM 40 MG: 40 INJECTION SUBCUTANEOUS at 21:58

## 2025-01-05 RX ADMIN — METRONIDAZOLE 500 MG: 500 INJECTION, SOLUTION INTRAVENOUS at 00:02

## 2025-01-05 RX ADMIN — CIPROFLOXACIN 400 MG: 400 INJECTION, SOLUTION INTRAVENOUS at 01:07

## 2025-01-05 RX ADMIN — METRONIDAZOLE 500 MG: 500 INJECTION, SOLUTION INTRAVENOUS at 23:24

## 2025-01-05 RX ADMIN — LISINOPRIL 40 MG: 40 TABLET ORAL at 18:22

## 2025-01-05 RX ADMIN — METRONIDAZOLE 500 MG: 500 INJECTION, SOLUTION INTRAVENOUS at 06:56

## 2025-01-05 RX ADMIN — ATORVASTATIN CALCIUM 20 MG: 20 TABLET, FILM COATED ORAL at 20:34

## 2025-01-05 SDOH — SOCIAL STABILITY: SOCIAL INSECURITY: WITHIN THE LAST YEAR, HAVE YOU BEEN HUMILIATED OR EMOTIONALLY ABUSED IN OTHER WAYS BY YOUR PARTNER OR EX-PARTNER?: NO

## 2025-01-05 SDOH — ECONOMIC STABILITY: INCOME INSECURITY: IN THE PAST 12 MONTHS HAS THE ELECTRIC, GAS, OIL, OR WATER COMPANY THREATENED TO SHUT OFF SERVICES IN YOUR HOME?: NO

## 2025-01-05 SDOH — SOCIAL STABILITY: SOCIAL INSECURITY: DO YOU FEEL UNSAFE GOING BACK TO THE PLACE WHERE YOU ARE LIVING?: NO

## 2025-01-05 SDOH — SOCIAL STABILITY: SOCIAL INSECURITY: ARE YOU OR HAVE YOU BEEN THREATENED OR ABUSED PHYSICALLY, EMOTIONALLY, OR SEXUALLY BY ANYONE?: NO

## 2025-01-05 SDOH — SOCIAL STABILITY: SOCIAL INSECURITY: HAVE YOU HAD THOUGHTS OF HARMING ANYONE ELSE?: NO

## 2025-01-05 SDOH — ECONOMIC STABILITY: FOOD INSECURITY: WITHIN THE PAST 12 MONTHS, YOU WORRIED THAT YOUR FOOD WOULD RUN OUT BEFORE YOU GOT THE MONEY TO BUY MORE.: NEVER TRUE

## 2025-01-05 SDOH — SOCIAL STABILITY: SOCIAL INSECURITY
WITHIN THE LAST YEAR, HAVE YOU BEEN RAPED OR FORCED TO HAVE ANY KIND OF SEXUAL ACTIVITY BY YOUR PARTNER OR EX-PARTNER?: NO

## 2025-01-05 SDOH — SOCIAL STABILITY: SOCIAL INSECURITY: WITHIN THE LAST YEAR, HAVE YOU BEEN AFRAID OF YOUR PARTNER OR EX-PARTNER?: NO

## 2025-01-05 SDOH — ECONOMIC STABILITY: FOOD INSECURITY: WITHIN THE PAST 12 MONTHS, THE FOOD YOU BOUGHT JUST DIDN'T LAST AND YOU DIDN'T HAVE MONEY TO GET MORE.: NEVER TRUE

## 2025-01-05 SDOH — SOCIAL STABILITY: SOCIAL INSECURITY: DOES ANYONE TRY TO KEEP YOU FROM HAVING/CONTACTING OTHER FRIENDS OR DOING THINGS OUTSIDE YOUR HOME?: NO

## 2025-01-05 SDOH — SOCIAL STABILITY: SOCIAL INSECURITY: HAVE YOU HAD ANY THOUGHTS OF HARMING ANYONE ELSE?: NO

## 2025-01-05 SDOH — SOCIAL STABILITY: SOCIAL INSECURITY: HAS ANYONE EVER THREATENED TO HURT YOUR FAMILY OR YOUR PETS?: NO

## 2025-01-05 SDOH — SOCIAL STABILITY: SOCIAL INSECURITY: DO YOU FEEL ANYONE HAS EXPLOITED OR TAKEN ADVANTAGE OF YOU FINANCIALLY OR OF YOUR PERSONAL PROPERTY?: NO

## 2025-01-05 SDOH — SOCIAL STABILITY: SOCIAL INSECURITY
WITHIN THE LAST YEAR, HAVE YOU BEEN KICKED, HIT, SLAPPED, OR OTHERWISE PHYSICALLY HURT BY YOUR PARTNER OR EX-PARTNER?: NO

## 2025-01-05 SDOH — SOCIAL STABILITY: SOCIAL INSECURITY: ARE THERE ANY APPARENT SIGNS OF INJURIES/BEHAVIORS THAT COULD BE RELATED TO ABUSE/NEGLECT?: NO

## 2025-01-05 SDOH — SOCIAL STABILITY: SOCIAL INSECURITY: ABUSE: ADULT

## 2025-01-05 ASSESSMENT — ACTIVITIES OF DAILY LIVING (ADL)
WALKS IN HOME: INDEPENDENT
JUDGMENT_ADEQUATE_SAFELY_COMPLETE_DAILY_ACTIVITIES: YES
PATIENT'S MEMORY ADEQUATE TO SAFELY COMPLETE DAILY ACTIVITIES?: YES
HEARING - LEFT EAR: FUNCTIONAL
ADEQUATE_TO_COMPLETE_ADL: YES
TOILETING: INDEPENDENT
GROOMING: INDEPENDENT
DRESSING YOURSELF: INDEPENDENT
LACK_OF_TRANSPORTATION: NO
BATHING: INDEPENDENT
FEEDING YOURSELF: INDEPENDENT
ASSISTIVE_DEVICE: EYEGLASSES
HEARING - RIGHT EAR: FUNCTIONAL

## 2025-01-05 ASSESSMENT — COGNITIVE AND FUNCTIONAL STATUS - GENERAL
DAILY ACTIVITIY SCORE: 24
MOBILITY SCORE: 24
PATIENT BASELINE BEDBOUND: NO
MOBILITY SCORE: 24
DAILY ACTIVITIY SCORE: 24

## 2025-01-05 ASSESSMENT — PAIN DESCRIPTION - LOCATION
LOCATION: HEAD
LOCATION: HEAD

## 2025-01-05 ASSESSMENT — LIFESTYLE VARIABLES
HOW MANY STANDARD DRINKS CONTAINING ALCOHOL DO YOU HAVE ON A TYPICAL DAY: PATIENT DOES NOT DRINK
HOW OFTEN DO YOU HAVE 6 OR MORE DRINKS ON ONE OCCASION: NEVER
HOW OFTEN DO YOU HAVE A DRINK CONTAINING ALCOHOL: NEVER
SKIP TO QUESTIONS 9-10: 1
AUDIT-C TOTAL SCORE: 0
AUDIT-C TOTAL SCORE: 0

## 2025-01-05 ASSESSMENT — PATIENT HEALTH QUESTIONNAIRE - PHQ9
1. LITTLE INTEREST OR PLEASURE IN DOING THINGS: NOT AT ALL
2. FEELING DOWN, DEPRESSED OR HOPELESS: NOT AT ALL
SUM OF ALL RESPONSES TO PHQ9 QUESTIONS 1 & 2: 0

## 2025-01-05 ASSESSMENT — PAIN SCALES - GENERAL
PAINLEVEL_OUTOF10: 2
PAINLEVEL_OUTOF10: 4
PAINLEVEL_OUTOF10: 3
PAINLEVEL_OUTOF10: 0 - NO PAIN

## 2025-01-05 NOTE — HOSPITAL COURSE
74-year-old male with past medical history of hypertension, hyperlipidemia, and GERD, who presented on 1/4/25 with acute abdominal pain most prominent in the right lower quadrant.  In the ED, vital signs were stable except hypertensive /98.  His labs were significant for leukocytosis and elevated lipase in 1000's.  Blood cultures were negative.  CMP did show mild hyponatremia of 133 but otherwise unremarkable.  CT abdomen/pelvis with IV contrast demonstrated findings suggestive of acute interstitial pancreatitis without evidence of necrosis and inflammatory changes in the duodenum and outpouching likely diverticulum.  He received Benadryl 50 mg IV, Pepcid 20 mg IV, LR bolus 2 L, morphine 4 mg IV, and Zofran 4 mg IV in the ED. He was then admitted for further evaluation and management of acute interstitial pancreatitis.  During hospitalization, patient was treated supportively with IV fluids, analgesics, and empiric antibiotics (ciprofloxacin and Flagyl), which significantly improved his abdominal pain. He tolerated oral intake as we gradually advance him to regular diet. Further studies were done; US RUQ showed gallbladder wall thickening with no evidence of pericholecystic fluid, gallstones, and biliary dilatation, hepatic steatosis, and enlarged pancreas.  IgG4 was normal.  Patient denies having history of heavy alcohol use, recent travel/new medication use or medication changes, recent unusual food intake, or recent exotic animal bites such as scorpion.  At this time, etiology of acute interstitial pancreatitis is unknown.  Nevertheless, he clinically improved with supportive care inpatient and will be discharged home with additional 6 days of ciprofloxacin and Flagyl to complete a full 7-day course empirically for intra-abdominal infection.

## 2025-01-05 NOTE — NURSING NOTE
1725: pt arrived in stable condition to unit from ED. Pt alert and orientedx4, reports 2/10 pain. Pt up ad vaibhav, independent at home. Educated to call if assistance needed

## 2025-01-05 NOTE — PROGRESS NOTES
"Subjective   No acute overnight events. Patient remained afebrile and hemodynamically stable.  He states that his abdominal pain improved and rated it 2 out of 10.  He denies any fever, chills, nausea, vomiting, or diarrhea.  Last bowel movement was yesterday; he had very small amount of stools and feels constipated.    Objective     Last Recorded Vitals:  Blood pressure (!) 182/109, pulse 84, temperature 36.9 °C (98.4 °F), temperature source Temporal, resp. rate 18, height 1.88 m (6' 2\"), weight 133 kg (294 lb), SpO2 94%.    Intake/Output last 3 Shifts:  No intake/output data recorded.    Physical Exam  Vitals and nursing note reviewed.   Constitutional:       General: He is not in acute distress.     Appearance: Normal appearance. He is not diaphoretic.   Eyes:      General: No scleral icterus.     Extraocular Movements: Extraocular movements intact.      Conjunctiva/sclera: Conjunctivae normal.   Cardiovascular:      Rate and Rhythm: Normal rate and regular rhythm.      Pulses: Normal pulses.      Heart sounds: Normal heart sounds. No murmur heard.  Pulmonary:      Effort: Pulmonary effort is normal. No respiratory distress.      Breath sounds: Normal breath sounds. No wheezing, rhonchi or rales.   Abdominal:      General: Bowel sounds are normal. There is no distension.      Palpations: Abdomen is soft.      Tenderness: There is abdominal tenderness in the right lower quadrant. There is guarding. There is no rebound.   Musculoskeletal:         General: No tenderness.      Cervical back: Neck supple.      Right lower leg: No edema.      Left lower leg: No edema.   Skin:     General: Skin is warm and dry.   Neurological:      Mental Status: He is alert and oriented to person, place, and time.         Medications:   lactated Ringer's, 100 mL/hr, Last Rate: 100 mL/hr (01/05/25 3609)        atenolol, 50 mg, oral, BID  atorvastatin, 20 mg, oral, Nightly  ciprofloxacin, 400 mg, intravenous, q12h  enoxaparin, 40 mg, " subcutaneous, q24h  lisinopril, 40 mg, oral, Daily  metroNIDAZOLE, 500 mg, intravenous, q8h  [START ON 1/6/2025] pantoprazole, 40 mg, oral, Daily before breakfast        PRN medications: acetaminophen, HYDROmorphone, oxyCODONE, oxygen    Pertinent Labs:  Results for orders placed or performed during the hospital encounter of 01/04/25 (from the past 24 hours)   ECG 12 Lead   Result Value Ref Range    Ventricular Rate 81 BPM    Atrial Rate 81 BPM    QRS Duration 94 ms    QT Interval 410 ms    QTC Calculation(Bazett) 476 ms    R Axis 5 degrees    T Axis 7 degrees    QRS Count 13 beats    Q Onset 208 ms    P Onset 109 ms    P Offset 147 ms    T Offset 413 ms    QTC Fredericia 453 ms   Urinalysis with Reflex Culture and Microscopic   Result Value Ref Range    Color, Urine Colorless (N) Light-Yellow, Yellow, Dark-Yellow    Appearance, Urine Clear Clear    Specific Gravity, Urine 1.038 (N) 1.005 - 1.035    pH, Urine 7.0 5.0, 5.5, 6.0, 6.5, 7.0, 7.5, 8.0    Protein, Urine NEGATIVE NEGATIVE, 10 (TRACE), 20 (TRACE) mg/dL    Glucose, Urine Normal Normal mg/dL    Blood, Urine NEGATIVE NEGATIVE    Ketones, Urine NEGATIVE NEGATIVE mg/dL    Bilirubin, Urine NEGATIVE NEGATIVE    Urobilinogen, Urine Normal Normal mg/dL    Nitrite, Urine NEGATIVE NEGATIVE    Leukocyte Esterase, Urine NEGATIVE NEGATIVE   Extra Urine Gray Tube   Result Value Ref Range    Extra Tube Hold for add-ons.    Blood Culture    Specimen: Peripheral Venipuncture; Blood culture   Result Value Ref Range    Blood Culture Loaded on Instrument - Culture in progress    IgG4   Result Value Ref Range    IgG 4 170 3 - 200 mg/dL   Blood Culture    Specimen: Peripheral Venipuncture; Blood culture   Result Value Ref Range    Blood Culture Loaded on Instrument - Culture in progress    CBC   Result Value Ref Range    WBC 14.5 (H) 4.4 - 11.3 x10*3/uL    nRBC 0.0 0.0 - 0.0 /100 WBCs    RBC 4.55 4.50 - 5.90 x10*6/uL    Hemoglobin 14.1 13.5 - 17.5 g/dL    Hematocrit 42.1 41.0 -  52.0 %    MCV 93 80 - 100 fL    MCH 31.0 26.0 - 34.0 pg    MCHC 33.5 32.0 - 36.0 g/dL    RDW 12.6 11.5 - 14.5 %    Platelets 240 150 - 450 x10*3/uL   Magnesium   Result Value Ref Range    Magnesium 1.72 1.60 - 2.40 mg/dL   Comprehensive Metabolic Panel   Result Value Ref Range    Glucose 120 (H) 74 - 99 mg/dL    Sodium 134 (L) 136 - 145 mmol/L    Potassium 4.0 3.5 - 5.3 mmol/L    Chloride 102 98 - 107 mmol/L    Bicarbonate 26 21 - 32 mmol/L    Anion Gap 10 10 - 20 mmol/L    Urea Nitrogen 5 (L) 6 - 23 mg/dL    Creatinine 0.81 0.50 - 1.30 mg/dL    eGFR >90 >60 mL/min/1.73m*2    Calcium 8.6 8.6 - 10.3 mg/dL    Albumin 3.6 3.4 - 5.0 g/dL    Alkaline Phosphatase 44 33 - 136 U/L    Total Protein 6.5 6.4 - 8.2 g/dL    AST 14 9 - 39 U/L    Bilirubin, Total 1.0 0.0 - 1.2 mg/dL    ALT 20 10 - 52 U/L   Lipase   Result Value Ref Range    Lipase 774 (H) 9 - 82 U/L   Phosphorus   Result Value Ref Range    Phosphorus 3.5 2.5 - 4.9 mg/dL   Troponin I, High Sensitivity   Result Value Ref Range    Troponin I, High Sensitivity 5 0 - 20 ng/L       Imaging:   US right upper quadrant    Result Date: 1/5/2025  STUDY: Right upper quadrant Ultrasound; 1/5/2025 3:43 PM INDICATION: Acute pancreatitis COMPARISON: CT A&P 1/4/2025 ACCESSION NUMBER(S): [id number] ORDERING CLINICIAN: UMAIR NEWBERRY TECHNIQUE: Ultrasound of the right upper quadrant.  Multiple images of the right upper quadrant were obtained. FINDINGS: Technically difficult examination due to patient's body habitus. The liver demonstrates increased echogenicity and measures 19.1 cm.   The gallbladder is distended and anechoic.  There are no stones. There is no pericholecystic fluid.  There is gallbladder wall thickening.  The sonographic Johansen's sign is negative.  The common bile duct measures 0.5 cm.  There is no intrahepatic biliary dilatation.   The pancreas appears enlarged.  The right kidney measures 13.2 cm in length.  Renal cortical echotexture is normal.  There is no  hydronephrosis.  There are no stones.  There are no cysts.    Diffuse hepatic steatosis.  Hepatomegaly. Enlarged pancreas. Distended gallbladder with wall thickening.  No cholelithiasis. Signed by Lang Walker MD    ECG 12 Lead    Result Date: 1/5/2025  Accelerated Junctional rhythm Abnormal ECG No previous ECGs available    XR chest 2 views    Result Date: 1/5/2025  STUDY: Chest Radiographs;  01/05/2025 9:27 AM INDICATION: Evaluation for pneumonia, acute respiratory distress syndrome. Additional History:  Pancreatitis. COMPARISON: None available. ACCESSION NUMBER(S): [id number] ORDERING CLINICIAN: UMAIR NEWBERRY TECHNIQUE:  Frontal and lateral chest. FINDINGS: CARDIOMEDIASTINAL SILHOUETTE: Cardiomediastinal silhouette is normal in size and configuration.  LUNGS: Lungs are clear.  ABDOMEN: No remarkable upper abdominal findings.  BONES: No acute osseous changes.    No acute cardiopulmonary disease.. Signed by Jose Miguel Matta MD    CT abdomen pelvis w IV contrast    Result Date: 1/4/2025  Interpreted By:  Brandi Escamilla, STUDY: CT ABDOMEN PELVIS W IV CONTRAST;  1/4/2025 7:47 pm   INDICATION: Signs/Symptoms:RLQ pain.   COMPARISON: None.   ACCESSION NUMBER(S): [id number]   ORDERING CLINICIAN: SAUNDRA SAINI   TECHNIQUE: Axial CT images of the abdomen and pelvis with coronal and sagittal reconstructed images obtained after intravenous administration of contrast   FINDINGS: LOWER CHEST: Bibasilar atelectasis/scarring. Small right Bochdalek's hernia. BONES: No acute osseous abnormality. Cecum changes including minimal retrolisthesis of L3 on L4, L4 on L5 and L5 on S1. ABDOMINAL WALL: Small sent inguinal hernias.   ABDOMEN:   LIVER: Within normal limits. BILE DUCTS: Normal caliber. GALLBLADDER: No calcified gallstones. No wall thickening. PANCREAS: Peripancreatic stranding extending along the uncinate and pancreatic head predominantly. No hypoenhancement identified. SPLEEN: Accessory splenules. ADRENALS: Within normal  limits. KIDNEYS and URETERS: Symmetric renal enhancement. No hydronephrosis or perinephric fluid collection.     VESSELS: No aortic aneurysm. RETROPERITONEUM: No pathologically enlarged retroperitoneal lymph nodes.   PELVIS:   REPRODUCTIVE ORGANS: No pelvic masses. BLADDER: Within normal limits.   BOWEL: No dilated bowel. Inflammatory changes extend to the 1st through 3rd section of the duodenum with mild wall thickening. 3.2 cm and 1.5 outpouching along the duodenum (coronal series 202, image 57) likely duodenal diverticulum. Feculent debris within the distal loops of small bowel. Normal appendix. PERITONEUM: No ascites or free air, no fluid collection.       Peripancreatic stranding predominately along the head and uncinate suggestive of acute interstitial pancreatitis without evidence of necrosis. Inflammatory changes extending to the adjacent duodenum. Adjacent duodenal air-filled outpouchings likely diverticulum versus collections.   MACRO: None   Signed by: Brandi Escamilla 1/4/2025 9:08 PM Dictation workstation:   [id number]     Assessment/Plan   This is a 74-year-old male with past medical history of hypertension, hyperlipidemia, and GERD, who presents with acute abdominal pain most prominent in the right lower quadrant.  He was found to have acute interstitial pancreatitis without necrosis.    # Acute interstitial pancreatitis without necrosis  # Concerns for cholelithiasis   # Leukocytosis  -Patient denies heavy alcohol use intake, recent travel, recent exposure to exotic animals like scorpion, or recent medication changes/unusual food intake. Etiology of pancreatitis is still unclear at this time.   -Negative blood cultures.  Plan:  -Pending right upper quadrant ultrasound to rule out cholelithiasis.  -Pending IgG4 to rule out autoimmune etiology.  -Continue with ciprofloxacin and metronidazole empirically (1/5-)  -On maintenance LR infusion  -On pain regimen (oxycodone and tylenol)  -Start clear liquid  diet since abdominal pain has improved following IV fluids and pain meds. Will advance diet as tolerated.    # Hypoxia  -He had an episode of oxygen desaturation in the 80s on room air and was found to be tachypneic.  Given that he is currently being treated for acute pancreatitis, there is a concern for ARDS although the it is less likely.  We will obtain chest x-ray to rule this out.  -Patient was placed on 2 L oxygen with significant improvement.  Will try to wean off oxygen as tolerated.    Chronic conditions:   Hypertension  Hyperlipidemia  GERD  -Continue home medications as tolerated  -On telemetry    Fluid: As needed  Electrolytes: Replete with goal K>4 and Mg>2  DVT prophylaxis: Lovenox  Diet: Regular/carb controlled/cardiac/renal  CODE STATUS: Full  Disposition: Pending clinical improvement    Emergency Contact: Extended Emergency Contact Information  Primary Emergency Contact: Frida Spaulding  Home Phone: [phone]  Mobile Phone: [phone]  Relation: Spouse    Patient was seen and case discussed with the attending.  Lucy Watson, DO  Internal Medicine PGY-1  Date: January 5, 2025

## 2025-01-05 NOTE — H&P
Internal Medicine    Admission H&P      Patient Giovani Spaulding PCP Johnathon Alejo DO    MRN [id number]  Admission Date 1/4/2025      Chief Complaint/Reason for Admission:  Giovani is a 64 y.o. male who presented today with  abdominal pain.    Subjective    Subjective   History of Presenting Illness  Mr. Giovani Spaulding is a 64 y.o. male with a past medical history of HTN, HDL, obesity, GERD presented to the ED with a chief complaint of right lower quadrant pain. Patient reported that yesterday afternoon he started to have crampy right lower quadrant abdominal pain and the pain is crampy/sharp, rated 5 out of 10 in severity and has no radiation. He also endorses fever, chills and constipation. Patient reported his last bowel movement was this morning. His home medications include atenolol, atorvastatin, Nexium and lisinopril. He is a social drinker with his last drink being almost a month ago before presentation. Otherwise he denies headache or blurring of vision. He denies chest pain, shortness of breath or urinary complaints.       ED course:  V/S: The temperature on presentation was 37.7, ME was 95, /98 and SpO2  was 95% on room air.   Labs: he has leukocytosis with wbc count of 13.8. Lipase was elevated at 1321. CMP showed mild hyponatremia with Na of 133, otherwise wnl.  Glu 150.     Imaging: CT abdomen pelvis w IV contrast shows peripancreatic stranding predominantly along the head and uncinate suggestive of acute interstitial pancreatitis without evidence of necrosis.  Inflammatory changes extending to the adjacent duodenum.  Adjacent duodenal air-filled outpouching likely diverticulum versus collections.    Intervention: He received Benadryl 50 mg IV, Pepcid 20 mg IV, LR bolus 2 L, morphine 4 mg IV and Zofran 4 mg IV in the ED.    Past Medical History  Active Ambulatory Problems     Diagnosis Date Noted    History of DVT in adulthood 05/09/2023    Benign essential hypertension 05/09/2023    Fatty  "liver 05/09/2023    GERD (gastroesophageal reflux disease) 05/09/2023    Hyperlipidemia 05/09/2023    Lumbar disc disease 05/09/2023    PUD (peptic ulcer disease) 05/09/2023    Tubular adenoma 05/09/2023    Displacement of lumbar intervertebral disc without myelopathy 05/15/2014    Class 2 severe obesity due to excess calories with serious comorbidity and body mass index (BMI) of 37.0 to 37.9 in adult 06/14/2024     Resolved Ambulatory Problems     Diagnosis Date Noted    No Resolved Ambulatory Problems     Past Medical History:   Diagnosis Date    Hypertension        Home Medication:  Prior to Admission medications    Medication Sig Start Date End Date Taking? Authorizing Provider   atenolol (Tenormin) 50 mg tablet Take 1 tablet (50 mg) by mouth 2 times a day. 8/12/24   Johnathon Alejo DO   atorvastatin (Lipitor) 20 mg tablet Take 1 tablet (20 mg) by mouth once daily. as directed 8/7/24 8/2/25  Johnathon Alejo DO   esomeprazole (NexIUM) 40 mg DR capsule Take 1 capsule (40 mg) by mouth once daily. Do not open capsule. 8/12/24   Johnathon Alejo DO   lisinopril 40 mg tablet Take 1 tablet (40 mg) by mouth once daily. Needs appointment to establish care with new primary for future refills 9/16/24 9/16/25  Johnathon Alejo DO         Allergies:  Allergies   Allergen Reactions    Omnipaque [Iohexol] Hives and Fever     Allergic reaction to IV contrast in CT 01/04/2025 - reddened flushed skin- rash and hives    Penicillins Other    Prednisone Other    Sulfamethoxazole Hives    Sulfamethoxazole-Trimethoprim Other    Trimethoprim Unknown        Review of System:  Pertinent positives and negatives as per history of present illness. Remainder of 10 point review of systems is negative.    Objective    Objective   Vital Signs:  Visit Vitals  /80   Pulse 80   Temp 37.7 °C (99.9 °F) (Temporal)   Resp 16   Ht 1.88 m (6' 2\")   Wt 133 kg (294 lb)   SpO2 95%   BMI 37.75 kg/m²   Smoking Status Never   BSA 2.64 " m²        Physical Examination:    Constitutional: A&Ox4, NAD, resting comfortable   Head and Face: Atraumatic, normocephalic   Eyes: Normal external exam, EOMI  ENT: Normal external inspection of ears and nose. Oropharynx normal.  Cardiovascular: S1/S2, no murmurs, rubs, or gallops, radial pulses +2  Pulmonary: CTAB, no respiratory distress, no wheezing, rales or rhonchi.  Abdomen: distended abdomen, has right lower quadrant direct tenderness, no rebound tenderness. Bowel sounds were appreciated.   MSK: Negative for edema, No joint swelling, normal movements of all extremities.   Neuro: Cranial nerves grossly are intact. No gross motor or sensory deficits.   Skin- No lesions, contusions, or erythema.  Psychiatric: Judgment intact. Appropriate mood, affect and behavior    Laboratory Data:    Results from last 7 days   Lab Units 01/04/25  1848   WBC AUTO x10*3/uL 13.8*   RBC AUTO x10*6/uL 4.89   HEMOGLOBIN g/dL 15.3     Results from last 7 days   Lab Units 01/04/25  1848   SODIUM mmol/L 133*   POTASSIUM mmol/L 3.9   CHLORIDE mmol/L 98   CO2 mmol/L 30   BUN mg/dL 7   CREATININE mg/dL 0.85   CALCIUM mg/dL 9.3   BILIRUBIN TOTAL mg/dL 0.7   ALT U/L 27   AST U/L 19       Imaging:  CT abdomen pelvis w IV contrast    Result Date: 1/4/2025  Interpreted By:  Brandi Escamilla, STUDY: CT ABDOMEN PELVIS W IV CONTRAST;  1/4/2025 7:47 pm   INDICATION: Signs/Symptoms:RLQ pain.   COMPARISON: None.   ACCESSION NUMBER(S): [id number]   ORDERING CLINICIAN: SAUNDRA SAINI   TECHNIQUE: Axial CT images of the abdomen and pelvis with coronal and sagittal reconstructed images obtained after intravenous administration of contrast   FINDINGS: LOWER CHEST: Bibasilar atelectasis/scarring. Small right Bochdalek's hernia. BONES: No acute osseous abnormality. Cecum changes including minimal retrolisthesis of L3 on L4, L4 on L5 and L5 on S1. ABDOMINAL WALL: Small sent inguinal hernias.   ABDOMEN:   LIVER: Within normal limits. BILE DUCTS: Normal  caliber. GALLBLADDER: No calcified gallstones. No wall thickening. PANCREAS: Peripancreatic stranding extending along the uncinate and pancreatic head predominantly. No hypoenhancement identified. SPLEEN: Accessory splenules. ADRENALS: Within normal limits. KIDNEYS and URETERS: Symmetric renal enhancement. No hydronephrosis or perinephric fluid collection.     VESSELS: No aortic aneurysm. RETROPERITONEUM: No pathologically enlarged retroperitoneal lymph nodes.   PELVIS:   REPRODUCTIVE ORGANS: No pelvic masses. BLADDER: Within normal limits.   BOWEL: No dilated bowel. Inflammatory changes extend to the 1st through 3rd section of the duodenum with mild wall thickening. 3.2 cm and 1.5 outpouching along the duodenum (coronal series 202, image 57) likely duodenal diverticulum. Feculent debris within the distal loops of small bowel. Normal appendix. PERITONEUM: No ascites or free air, no fluid collection.       Peripancreatic stranding predominately along the head and uncinate suggestive of acute interstitial pancreatitis without evidence of necrosis. Inflammatory changes extending to the adjacent duodenum. Adjacent duodenal air-filled outpouchings likely diverticulum versus collections.   MACRO: None   Signed by: Brandi Escamilla 1/4/2025 9:08 PM Dictation workstation:   [id number]      Medications:  Scheduled medications  enoxaparin, 40 mg, subcutaneous, q24h      Continuous medications  lactated Ringer's, 100 mL/hr      PRN medications      Assessment    Assessment & Plan   Mr. Giovani Spaulding is a 64 y.o. male with a past medical history of HTN, HDL, obesity, GERD presented to the ED with a chief complaint of right lower quadrant pain. Patient reported that yesterday afternoon he started to have crampy right lower quadrant abdominal pain and the pain is crampy/sharp, rated 5 out of 10 in severity and has no radiation. He also endorses fever, chills and constipation. Patient admitted to the floor with for further  management of pancreatitis.   Assessment & Plan  Acute pancreatitis, unspecified complication status, unspecified pancreatitis type (HHS-HCC)      # Acute pancreatitis  # Right lower quadrant pain  # Leukocytosis  - Patient presented with right lower quadrant pain of 2 days duration associated with fever, chills and constipation.  - He is diagnosed to have pancreatitis because of elevated lipase and CT abd finding which is suggestive of pancreatitis.  - Also his presentation and RLQ pain is concerning for appendicitis, CT abd/pel is negative for appendicitis. Leukocytosis could be because of pancreatitis.   - The CT abd didn't show cholelithiasis or dilated extrahepatic ducts/ choledocholithiasis which is one of the most common causes of pancreatitis.   - The patient is a social alcohol drinker with his last drink being more than one month before his presentation to the hospital which rules out alcohol as a cause of pancreatitis in this patient.  - Hypertriglyceridemia is the third most common cause of pancreatitis but relatively rare. TG pending in this patient.  - ACEI are rare causes of pancreatitis and they often goes unrecognized and as the risk increases with higher doses, I would consider them as a potential cause of pancreatitis in this patient as he takes lisinopril 40 mg po daily.   - Although its rare autoimmune pancreatitis is one of the ddx for this patient, IgG4 pending.   Plan   - Admit to the floor with telemetry monitor  - Npo  - IV maintenance fluid  - Monitor VS  - Daily labs  - Right upper quadrant US   - started on antibiotics for concern of cholangitis    CHRONIC PROBLEMS:  # HTN  # HDL  # Obesity  # GERD  - Will continue home meds after med rec.      Global Plan of Care  Fluid: PRN  Electrolytes: PRN  Nutrition: n.p.o.  DVT Prophylaxis: Lovenox & SCD  GI Prophylaxis:  Code Status: Full Code     Emergency Contact: Extended Emergency Contact Information  Primary Emergency Contact:  Frida Spaulding  Home Phone: [phone]  Mobile Phone: [phone]  Relation: Spouse    Patient to be staffed with attending physician.  Signature: George Garay MD PGY I Internal medicine resident  Date: January 4, 2025  This note has been transcribed using Dragon voice recognition system and there is a possibility of unintentional typing misprints.  Any information found to be copied from previous providers is done in the best interest of the patient to provide accurate, quality, and continuity of care.     ---------------------------------------------------------------------------------------    Senior addendum: The patient was seen and examined independently from the intern physician.  The above documentation reflects my edits and input.    64-year-old male with PMHx of HTN, obesity, HLD, lifelong refractory GERD, chronic PPI use, history of DVT in adulthood, multiple spinal and joints injuries from previous sports practice, and umbilical hernia who presented to ED with RLQ pain, mild epigastric and RUQ pain and tenderness which started the morning of admission in addition to low-grade subjective fever, and episode of diarrhea the day prior.  He reports eating fatty chicken meal for lunch the day of admission which seems to worsen symptoms.  Denies any other complaints.    Targeted exam: Johansen sign positive, RLQ tenderness to palpation with rebound.    In ED:  VS: /98, HR 95, RR 18, T 37.7, SpO2 95% on RA  Labs: Leukocytosis 13.8 with elevated ANC 10.73, monocyte absolute 1.21.  Chemistry with , sodium 133, lipase 1321.  UA unremarkable, blood cultures obtained, COVID and flu negative.  EKG: Accelerated junctional rhythm with no previous EKGs to compare.  QTc 476.  Imaging: CT abdomen pelvis with IV contrast showing peripancreatic stranding predominantly along the head and uncinate suggestive of acute interstitial pancreatitis without evidence of necrosis.  Inflammatory changes extending to the  adjacent duodenum.  Adjacent duodenal air-filled outpouching likely diverticulum versus collections.    He received 2 L LR boluses, morphine, Zofran in ED.  He developed urticarial rash and facial erythema after receiving contrast, which is known to have, for which he received Benadryl and Pepcid with provided relief.    Assessment and plan:    # Acute interstitial pancreatitis  # Right lower quadrant pain highly concerning for appendicitis  # Leukocytosis  # Chronic GERD  # Obesity and metabolic syndrome  # History of DVT  The etiology of this presentation can be multifactorial, likely precipitated by recent fatty meal with possible PPI use (it is known to alter GI microbiota in addition to gastric/duodenal polyps) and underlying metabolic syndrome (obesity, HLD).  The elevated lipase, peripancreatic stranding on imaging and clinical presentation is consistent with a diagnosis (even though the RLQ tenderness is concerning for appendicitis which did not show on CT).  The lack of necrosis/fluid collections suggest mild to moderate severity.  The adjacent duodenal air-filled outpouching likely represent duodenal diverticulum (unclear if it is causing obstruction or infection).  Acute cholangitis with atypical presentation needs to still be taken into consideration.  Additionally, history of DVT can increase the risk of thromboembolism and acute pancreatitis 2/2 systemic inflammation (DVT PPx is essentially here)    (Update: Spoke to radiology to take a second look at the CT abdomen and pelvis, they reported mild to moderate distention of the gallbladder with possible sludge that could represent cholelithiasis.  Also they said that the appendix is not visualized, even though no signs of inflammation/fat stranding in the cecum/surrounding the area and advised to correlate clinically)    Plan:  -Admitted to [facility] on telemetry  -Keep n.p.o. advance diet as tolerated after symptoms resolved and labs start downtrending  -S/p  IVF resuscitation in ED, continue hydration  -Pain management  -Avoid NSAIDs  -RUQ ultrasound ordered  -IgG4 for possible immune etiology  -Daily CBC, CMP, lipase, electrolytes  -Blood cultures obtained and pending  -Even though leukocytosis could be reactive, however we initiated patient on antibiotics (ciprofloxacin and metronidazole) until further cultures data is available  -Reinforce lifestyle/dietary modification  -Outpatient GI follow-up for possible MRCP/ERCP and further evaluation.  -If no improvement/worsening, inpatient GI consult is warranted especially with the new read of mild/moderate gallbladder distention with sludge (defer to day team)  -Consider acute surgery consult for possible evaluation and management appendicitis.  -Could not repeat CT abdomen with contrast considering contrast allergy.    # Accelerated junctional rhythm on EKG  No EKG to compare, patient denies chest pain.  No echocardiogram on file.  -Continue to monitor on telemetry  -Ordering troponin  -Continue to follow and consider echocardiogram if suspicions arise.    # Chronic GERD long-term PPI use  # History of multiple fractures/fragility fractures  -Reassess necessity of chronic PPIs pancreatitis with resolution considering reported/evidenced risk of chronic use, including osteoporosis (patient stated he had a DEXA scan in the past and it did show osteoporosis, needs to be verified).  -Consider alternative GERD management including H2 blockers and lifestyle modifications.    # Contrast allergy  This incidence was documented in patient's chart by ED staff.  Patient is aware.  -Reinforced premedication for future tests  -Consider alternative imaging when possible    Chronic conditions:  HTN  HLD  GERD  -Continue home medication once med rec is done    Diet: N.p.o.  DVT ppx: Lovenox  Telemetry: Indicated  Consults: None, consider GI  CODE STATUS: Full code    Bryon Villatoro M.D. PGY-3  Internal Medicine

## 2025-01-06 ENCOUNTER — PHARMACY VISIT (OUTPATIENT)
Dept: PHARMACY | Facility: CLINIC | Age: 65
End: 2025-01-06
Payer: COMMERCIAL

## 2025-01-06 VITALS
SYSTOLIC BLOOD PRESSURE: 152 MMHG | HEART RATE: 72 BPM | OXYGEN SATURATION: 97 % | WEIGHT: 294 LBS | BODY MASS INDEX: 37.73 KG/M2 | DIASTOLIC BLOOD PRESSURE: 92 MMHG | HEIGHT: 74 IN | RESPIRATION RATE: 16 BRPM | TEMPERATURE: 98.1 F

## 2025-01-06 PROBLEM — K85.90 ACUTE PANCREATITIS, UNSPECIFIED COMPLICATION STATUS, UNSPECIFIED PANCREATITIS TYPE (HHS-HCC): Status: RESOLVED | Noted: 2025-01-04 | Resolved: 2025-01-06

## 2025-01-06 LAB
ALBUMIN SERPL BCP-MCNC: 3.5 G/DL (ref 3.4–5)
ALP SERPL-CCNC: 39 U/L (ref 33–136)
ALT SERPL W P-5'-P-CCNC: 15 U/L (ref 10–52)
ANION GAP SERPL CALC-SCNC: 11 MMOL/L (ref 10–20)
AST SERPL W P-5'-P-CCNC: 12 U/L (ref 9–39)
BILIRUB SERPL-MCNC: 1.2 MG/DL (ref 0–1.2)
BUN SERPL-MCNC: 5 MG/DL (ref 6–23)
CALCIUM SERPL-MCNC: 8.7 MG/DL (ref 8.6–10.3)
CHLORIDE SERPL-SCNC: 99 MMOL/L (ref 98–107)
CO2 SERPL-SCNC: 27 MMOL/L (ref 21–32)
CREAT SERPL-MCNC: 0.68 MG/DL (ref 0.5–1.3)
EGFRCR SERPLBLD CKD-EPI 2021: >90 ML/MIN/1.73M*2
ERYTHROCYTE [DISTWIDTH] IN BLOOD BY AUTOMATED COUNT: 12.3 % (ref 11.5–14.5)
GLUCOSE SERPL-MCNC: 102 MG/DL (ref 74–99)
HCT VFR BLD AUTO: 40.9 % (ref 41–52)
HGB BLD-MCNC: 13.6 G/DL (ref 13.5–17.5)
MAGNESIUM SERPL-MCNC: 1.72 MG/DL (ref 1.6–2.4)
MCH RBC QN AUTO: 30.8 PG (ref 26–34)
MCHC RBC AUTO-ENTMCNC: 33.3 G/DL (ref 32–36)
MCV RBC AUTO: 93 FL (ref 80–100)
NRBC BLD-RTO: 0 /100 WBCS (ref 0–0)
PHOSPHATE SERPL-MCNC: 3 MG/DL (ref 2.5–4.9)
PLATELET # BLD AUTO: 200 X10*3/UL (ref 150–450)
POTASSIUM SERPL-SCNC: 3.7 MMOL/L (ref 3.5–5.3)
PROT SERPL-MCNC: 6.4 G/DL (ref 6.4–8.2)
RBC # BLD AUTO: 4.42 X10*6/UL (ref 4.5–5.9)
SODIUM SERPL-SCNC: 133 MMOL/L (ref 136–145)
WBC # BLD AUTO: 13.8 X10*3/UL (ref 4.4–11.3)

## 2025-01-06 PROCEDURE — 2500000001 HC RX 250 WO HCPCS SELF ADMINISTERED DRUGS (ALT 637 FOR MEDICARE OP)

## 2025-01-06 PROCEDURE — 80053 COMPREHEN METABOLIC PANEL: CPT

## 2025-01-06 PROCEDURE — 36415 COLL VENOUS BLD VENIPUNCTURE: CPT

## 2025-01-06 PROCEDURE — 83735 ASSAY OF MAGNESIUM: CPT

## 2025-01-06 PROCEDURE — RXMED WILLOW AMBULATORY MEDICATION CHARGE

## 2025-01-06 PROCEDURE — 85027 COMPLETE CBC AUTOMATED: CPT

## 2025-01-06 PROCEDURE — 84100 ASSAY OF PHOSPHORUS: CPT

## 2025-01-06 PROCEDURE — 99239 HOSP IP/OBS DSCHRG MGMT >30: CPT

## 2025-01-06 PROCEDURE — 2500000004 HC RX 250 GENERAL PHARMACY W/ HCPCS (ALT 636 FOR OP/ED)

## 2025-01-06 RX ORDER — METRONIDAZOLE 500 MG/1
500 TABLET ORAL EVERY 8 HOURS SCHEDULED
Qty: 17 TABLET | Refills: 0 | Status: SHIPPED | OUTPATIENT
Start: 2025-01-06 | End: 2025-01-12

## 2025-01-06 RX ORDER — METRONIDAZOLE 500 MG/1
500 TABLET ORAL EVERY 8 HOURS SCHEDULED
Status: DISCONTINUED | OUTPATIENT
Start: 2025-01-06 | End: 2025-01-06 | Stop reason: HOSPADM

## 2025-01-06 RX ORDER — CIPROFLOXACIN 500 MG/1
500 TABLET ORAL EVERY 12 HOURS SCHEDULED
Qty: 11 TABLET | Refills: 0 | Status: SHIPPED | OUTPATIENT
Start: 2025-01-06 | End: 2025-01-12

## 2025-01-06 RX ORDER — CIPROFLOXACIN 500 MG/1
500 TABLET ORAL EVERY 12 HOURS SCHEDULED
Status: DISCONTINUED | OUTPATIENT
Start: 2025-01-06 | End: 2025-01-06 | Stop reason: HOSPADM

## 2025-01-06 RX ORDER — POLYETHYLENE GLYCOL 3350 17 G/17G
17 POWDER, FOR SOLUTION ORAL DAILY
Status: DISCONTINUED | OUTPATIENT
Start: 2025-01-06 | End: 2025-01-06 | Stop reason: HOSPADM

## 2025-01-06 RX ORDER — AMOXICILLIN 250 MG
1 CAPSULE ORAL 2 TIMES DAILY
Status: DISCONTINUED | OUTPATIENT
Start: 2025-01-06 | End: 2025-01-06 | Stop reason: HOSPADM

## 2025-01-06 RX ADMIN — SENNOSIDES AND DOCUSATE SODIUM 1 TABLET: 50; 8.6 TABLET ORAL at 09:37

## 2025-01-06 RX ADMIN — ATENOLOL 50 MG: 50 TABLET ORAL at 09:37

## 2025-01-06 RX ADMIN — CIPROFLOXACIN 500 MG: 500 TABLET ORAL at 11:02

## 2025-01-06 RX ADMIN — METRONIDAZOLE 500 MG: 500 INJECTION, SOLUTION INTRAVENOUS at 06:28

## 2025-01-06 RX ADMIN — POLYETHYLENE GLYCOL 3350 17 G: 17 POWDER, FOR SOLUTION ORAL at 09:37

## 2025-01-06 RX ADMIN — PANTOPRAZOLE SODIUM 40 MG: 40 TABLET, DELAYED RELEASE ORAL at 06:28

## 2025-01-06 RX ADMIN — LISINOPRIL 40 MG: 40 TABLET ORAL at 09:37

## 2025-01-06 RX ADMIN — METRONIDAZOLE 500 MG: 500 TABLET ORAL at 13:02

## 2025-01-06 ASSESSMENT — PAIN SCALES - GENERAL
PAINLEVEL_OUTOF10: 0 - NO PAIN
PAINLEVEL_OUTOF10: 2

## 2025-01-06 ASSESSMENT — COGNITIVE AND FUNCTIONAL STATUS - GENERAL
MOBILITY SCORE: 24
DAILY ACTIVITIY SCORE: 24

## 2025-01-06 NOTE — DISCHARGE SUMMARY
Discharge Diagnosis  Acute pancreatitis, unspecified complication status, unspecified pancreatitis type (HHS-HCC)    Issues Requiring Follow-Up  Follow-up with PCP in the next week regarding hospitalization stay and BP control.     Discharge Meds     Medication List      START taking these medications     ciprofloxacin 500 mg tablet; Commonly known as: Cipro; Take 1 tablet   (500 mg) by mouth every 12 hours for 11 doses.   metroNIDAZOLE 500 mg tablet; Commonly known as: Flagyl; Take 1 tablet   (500 mg) by mouth every 8 hours for 17 doses.     CONTINUE taking these medications     atenolol 50 mg tablet; Commonly known as: Tenormin; Take 1 tablet (50   mg) by mouth 2 times a day.   atorvastatin 20 mg tablet; Commonly known as: Lipitor; Take 1 tablet (20   mg) by mouth once daily. as directed   esomeprazole 40 mg DR capsule; Commonly known as: NexIUM; Take 1 capsule   (40 mg) by mouth once daily. Do not open capsule.   lisinopril 40 mg tablet; Take 1 tablet (40 mg) by mouth once daily.   Needs appointment to establish care with new primary for future refills       Test Results Pending At Discharge  Pending Labs       Order Current Status    Blood Culture Preliminary result    Blood Culture Preliminary result            Hospital Course  74-year-old male with past medical history of hypertension, hyperlipidemia, and GERD, who presented on 1/4/25 with acute abdominal pain most prominent in the right lower quadrant.  In the ED, vital signs were stable except hypertensive /98.  His labs were significant for leukocytosis and elevated lipase in 1000's.  Blood cultures were negative.  CMP did show mild hyponatremia of 133 but otherwise unremarkable.  CT abdomen/pelvis with IV contrast demonstrated findings suggestive of acute interstitial pancreatitis without evidence of necrosis and inflammatory changes in the duodenum and outpouching likely diverticulum.  He received Benadryl 50 mg IV, Pepcid 20 mg IV, LR bolus 2 L,  morphine 4 mg IV, and Zofran 4 mg IV in the ED. He was then admitted for further evaluation and management of acute interstitial pancreatitis.  During hospitalization, patient was treated supportively with IV fluids, analgesics, and empiric antibiotics (ciprofloxacin and Flagyl), which significantly improved his abdominal pain. He tolerated oral intake as we gradually advance him to regular diet. Further studies were done; US RUQ showed gallbladder wall thickening with no evidence of pericholecystic fluid, gallstones, and biliary dilatation, hepatic steatosis, and enlarged pancreas.  IgG4 was normal.  Patient denies having history of heavy alcohol use, recent travel/new medication use or medication changes, recent unusual food intake, or recent exotic animal bites such as scorpion.  At this time, etiology of acute interstitial pancreatitis is unknown.  Nevertheless, he clinically improved with supportive care inpatient and will be discharged home with additional 6 days of ciprofloxacin and Flagyl to complete a full 7-day course empirically for intra-abdominal infection.    Pertinent Physical Exam At Time of Discharge  Physical Exam  Vitals and nursing note reviewed.   Constitutional:       General: He is not in acute distress.     Appearance: Normal appearance. He is not diaphoretic.   Eyes:      General: No scleral icterus.     Extraocular Movements: Extraocular movements intact.      Conjunctiva/sclera: Conjunctivae normal.   Cardiovascular:      Rate and Rhythm: Normal rate and regular rhythm.      Pulses: Normal pulses.      Heart sounds: Normal heart sounds. No murmur heard.  Pulmonary:      Effort: Pulmonary effort is normal. No respiratory distress.      Breath sounds: Normal breath sounds. No wheezing, rhonchi or rales.   Abdominal:      General: Bowel sounds are normal. There is no distension.      Palpations: Abdomen is soft.      Tenderness: There is abdominal tenderness (mild) in the right lower  quadrant. There is no guarding or rebound.   Musculoskeletal:         General: No tenderness.      Cervical back: Neck supple.      Right lower leg: No edema.      Left lower leg: No edema.   Skin:     General: Skin is warm and dry.   Neurological:      Mental Status: He is alert and oriented to person, place, and time.         Outpatient Follow-Up  No future appointments.      Lucy Watson, DO

## 2025-01-06 NOTE — SIGNIFICANT EVENT
Dr. Watson notified of patient bp 152/92 manually Patient states abdomen feels better after eating he says. Patient states he is kind of anxious about discharging, he wants to discharge. Per Dr. Watson elevated bp may be from anxiety and patient should follow up with pcp regarding blood pressure

## 2025-01-06 NOTE — NURSING NOTE
Patient stable this shift. Patient states abdominal pain has improved after advancing his diet. Tolerated advanced diet well, denies any nausea or vomiting. Discharge instructions reviewed with patient. Patient states he doesn't have any question. Meds to bed delivery at bedside by pharmacy. Patient awaiting transport home.

## 2025-01-06 NOTE — NURSING NOTE
ADOD: 1/6.   Destination: home  Transportation Provided by: Family Member  Patient feels updated by provider(s) and involved in POC.   Patient has been advised to defer to AVS for new medications and follow-up visits.   Patient is active with [facility].  Patient's Pharmacy [facility].  Appointments will be made by patient.  Patient has been notified about a survey and call back is to be expected 1-2 weeks post discharge.   Notified patient that DC Navigator is available everyday throughout their stay if any assistance is needed.     DC pending diet tolerance  Patients daughter will transport & [facility] needs to be delivered

## 2025-01-06 NOTE — CARE PLAN
Problem: Pain - Adult  Goal: Verbalizes/displays adequate comfort level or baseline comfort level  Outcome: Met     Problem: Safety - Adult  Goal: Free from fall injury  Outcome: Met     Problem: Discharge Planning  Goal: Discharge to home or other facility with appropriate resources  Outcome: Met

## 2025-01-06 NOTE — DISCHARGE INSTRUCTIONS
You were seen in the hospital for acute pancreatitis.  Please follow up with your PCP in the next week regarding hospitalization stay and BP control.    Please take all of your medications as directed.     New medications:  Take 1 tablet of ciprofloxacin every 12 hours and 1 tab metronidazole every 8 hours for the next 6 days.    If you have any worsening symptoms such as this of breath, chest pain, severe abdominal pain, severe nausea and vomiting to the point cannot tolerate oral intake, or concerning symptoms, please call your doctor or return to the hospital.    Thank you for allowing us to participate in your health care.    -[facility] Inpatient Medicine Teaching Service.

## 2025-01-06 NOTE — NURSING NOTE
EOS:  Slept on and off through the night.  Remains independent in room, ambulating to BR and sometimes using urinal.  Remains on RA, monitor continues to show NSR, VSS.  Medicated once with Oxycodone for pain with good results.

## 2025-01-06 NOTE — PROGRESS NOTES
01/06/25 1405   Discharge Planning   Living Arrangements Spouse/significant other;Children   Support Systems Spouse/significant other   Type of Residence Private residence   Home or Post Acute Services None   Financial Resource Strain   How hard is it for you to pay for the very basics like food, housing, medical care, and heating? Not hard   Housing Stability   In the last 12 months, was there a time when you were not able to pay the mortgage or rent on time? N   At any time in the past 12 months, were you homeless or living in a shelter (including now)? N   Intensity of Service   Intensity of Service 0-30 min     Reviewed chart and met with pt. Pt is from home with wife and children. He is completely independent with care. His PCP is Dr. Alejo. Pt has no concerns paying his bills or for medications. He plans to return home at discharge. No discharge needs identified at this time.

## 2025-01-07 ENCOUNTER — PATIENT OUTREACH (OUTPATIENT)
Dept: PRIMARY CARE | Facility: CLINIC | Age: 65
End: 2025-01-07
Payer: COMMERCIAL

## 2025-01-09 LAB
BACTERIA BLD CULT: NORMAL
BACTERIA BLD CULT: NORMAL

## 2025-01-14 ENCOUNTER — APPOINTMENT (OUTPATIENT)
Dept: PRIMARY CARE | Facility: CLINIC | Age: 65
End: 2025-01-14
Payer: COMMERCIAL

## 2025-01-14 VITALS
WEIGHT: 288.8 LBS | SYSTOLIC BLOOD PRESSURE: 126 MMHG | RESPIRATION RATE: 16 BRPM | TEMPERATURE: 98.4 F | BODY MASS INDEX: 37.06 KG/M2 | OXYGEN SATURATION: 96 % | DIASTOLIC BLOOD PRESSURE: 80 MMHG | HEART RATE: 60 BPM | HEIGHT: 74 IN

## 2025-01-14 DIAGNOSIS — I10 BENIGN ESSENTIAL HYPERTENSION: ICD-10-CM

## 2025-01-14 DIAGNOSIS — K85.90 ACUTE PANCREATITIS WITHOUT INFECTION OR NECROSIS, UNSPECIFIED PANCREATITIS TYPE (HHS-HCC): Primary | ICD-10-CM

## 2025-01-14 DIAGNOSIS — E78.5 HYPERLIPIDEMIA, UNSPECIFIED HYPERLIPIDEMIA TYPE: ICD-10-CM

## 2025-01-14 DIAGNOSIS — K21.9 GASTROESOPHAGEAL REFLUX DISEASE WITHOUT ESOPHAGITIS: ICD-10-CM

## 2025-01-14 PROCEDURE — 3079F DIAST BP 80-89 MM HG: CPT | Performed by: INTERNAL MEDICINE

## 2025-01-14 PROCEDURE — 99495 TRANSJ CARE MGMT MOD F2F 14D: CPT | Performed by: INTERNAL MEDICINE

## 2025-01-14 PROCEDURE — 3074F SYST BP LT 130 MM HG: CPT | Performed by: INTERNAL MEDICINE

## 2025-01-14 PROCEDURE — 3008F BODY MASS INDEX DOCD: CPT | Performed by: INTERNAL MEDICINE

## 2025-01-14 RX ORDER — ATORVASTATIN CALCIUM 20 MG/1
20 TABLET, FILM COATED ORAL NIGHTLY
Qty: 90 TABLET | Refills: 3 | Status: SHIPPED | OUTPATIENT
Start: 2025-01-14 | End: 2026-01-14

## 2025-01-14 ASSESSMENT — ENCOUNTER SYMPTOMS
SHORTNESS OF BREATH: 0
PALPITATIONS: 0
FEVER: 0
CHILLS: 0
TROUBLE SWALLOWING: 0
SORE THROAT: 0
ABDOMINAL PAIN: 0

## 2025-01-14 ASSESSMENT — PAIN SCALES - GENERAL: PAINLEVEL_OUTOF10: 0-NO PAIN

## 2025-01-14 NOTE — ASSESSMENT & PLAN NOTE
Orders:    atorvastatin (Lipitor) 20 mg tablet; Take 1 tablet (20 mg) by mouth once daily at bedtime. as directed

## 2025-01-14 NOTE — PROGRESS NOTES
"Subjective   Giovani Spaulding is a 64 y.o. male who presents for Hospital Follow-up.      Admit date: 1.4.2025-1.6.2025  Admission diagnosis: pancreatitis   Additional comments: patient had labs, CT,xray,Us and EKG done while in the hospital   Patient states that he has not had pains in the lower right side  since getting out of the hospital   No longer having constipation/diarrhea  Patient reports still having night sweats.  Still having night sweats but the chills have resolved.  Patient was given 2 antibiotics and he has a couple doses left     Lost weight since then and eating healthier.    No alcohol since last September.          Review of Systems   Constitutional:  Negative for chills and fever.   HENT:  Negative for sore throat and trouble swallowing.    Respiratory:  Negative for shortness of breath.    Cardiovascular:  Negative for chest pain, palpitations and leg swelling.   Gastrointestinal:  Negative for abdominal pain.   Skin:  Negative for rash.   All other systems reviewed and are negative.      Objective   /80   Pulse 60   Temp 36.9 °C (98.4 °F)   Resp 16   Ht 1.88 m (6' 2\")   Wt 131 kg (288 lb 12.8 oz)   SpO2 96%   BMI 37.08 kg/m²       Physical Exam  Constitutional:       Appearance: Normal appearance.   HENT:      Head: Normocephalic.      Right Ear: Tympanic membrane, ear canal and external ear normal.      Left Ear: Tympanic membrane, ear canal and external ear normal.      Nose: Nose normal.      Mouth/Throat:      Mouth: Mucous membranes are moist.      Pharynx: Oropharynx is clear.   Eyes:      Conjunctiva/sclera: Conjunctivae normal.   Cardiovascular:      Rate and Rhythm: Normal rate and regular rhythm.   Pulmonary:      Effort: Pulmonary effort is normal.      Breath sounds: Normal breath sounds.   Abdominal:      General: Abdomen is flat. There is no distension.      Palpations: Abdomen is soft. There is no mass.      Tenderness: There is no abdominal tenderness. There is no " guarding or rebound.   Musculoskeletal:         General: Normal range of motion.      Cervical back: Neck supple.   Skin:     General: Skin is warm and dry.   Neurological:      General: No focal deficit present.      Mental Status: He is alert and oriented to person, place, and time.   Psychiatric:         Mood and Affect: Mood normal.         Assessment & Plan  Acute pancreatitis without infection or necrosis, unspecified pancreatitis type ([facility])    Orders:    Amylase; Future    Lipase; Future    Comprehensive metabolic panel; Future    CBC and Auto Differential; Future    C-reactive protein; Future    Sedimentation Rate; Future    RAMIN with Reflex to CLAUDIA; Future    Referral to Gastroenterology; Future    Hyperlipidemia, unspecified hyperlipidemia type    Orders:    atorvastatin (Lipitor) 20 mg tablet; Take 1 tablet (20 mg) by mouth once daily at bedtime. as directed    Gastroesophageal reflux disease without esophagitis    Orders:    esomeprazole (NexIUM) 40 mg DR capsule; Take 1 capsule (40 mg) by mouth once daily. Do not open capsule.    Benign essential hypertension              Johnathon Alejo, DO

## 2025-01-14 NOTE — ASSESSMENT & PLAN NOTE
Orders:    esomeprazole (NexIUM) 40 mg DR capsule; Take 1 capsule (40 mg) by mouth once daily. Do not open capsule.

## 2025-01-20 ENCOUNTER — PATIENT OUTREACH (OUTPATIENT)
Dept: PRIMARY CARE | Facility: CLINIC | Age: 65
End: 2025-01-20
Payer: COMMERCIAL

## 2025-01-20 NOTE — PROGRESS NOTES
Call regarding appt. with PCP on 1/14/2025 after hospitalization.  At time of outreach call the patient feels as if their condition has improved since last visit.  Reviewed the PCP appointment with the pt and addressed any questions or concerns.    30-Sep-2019 12:30

## 2025-01-23 RX ORDER — ESOMEPRAZOLE MAGNESIUM 40 MG/1
40 CAPSULE, DELAYED RELEASE ORAL DAILY
Qty: 90 CAPSULE | Refills: 3 | Status: SHIPPED | OUTPATIENT
Start: 2025-01-23

## 2025-01-31 LAB
ATRIAL RATE: 81 BPM
P OFFSET: 147 MS
P ONSET: 109 MS
PR INTERVAL: 278 MS
Q ONSET: 208 MS
QRS COUNT: 13 BEATS
QRS DURATION: 94 MS
QT INTERVAL: 410 MS
QTC CALCULATION(BAZETT): 476 MS
QTC FREDERICIA: 453 MS
R AXIS: 5 DEGREES
T AXIS: 7 DEGREES
T OFFSET: 413 MS
VENTRICULAR RATE: 81 BPM

## 2025-02-19 ENCOUNTER — PATIENT OUTREACH (OUTPATIENT)
Dept: PRIMARY CARE | Facility: CLINIC | Age: 65
End: 2025-02-19
Payer: COMMERCIAL

## 2025-03-21 ENCOUNTER — PATIENT OUTREACH (OUTPATIENT)
Dept: PRIMARY CARE | Facility: CLINIC | Age: 65
End: 2025-03-21
Payer: COMMERCIAL

## 2025-04-17 ENCOUNTER — APPOINTMENT (OUTPATIENT)
Dept: PRIMARY CARE | Facility: CLINIC | Age: 65
End: 2025-04-17
Payer: COMMERCIAL

## 2025-04-17 VITALS
RESPIRATION RATE: 18 BRPM | SYSTOLIC BLOOD PRESSURE: 136 MMHG | HEART RATE: 72 BPM | DIASTOLIC BLOOD PRESSURE: 84 MMHG | HEIGHT: 74 IN | WEIGHT: 303 LBS | TEMPERATURE: 97.6 F | BODY MASS INDEX: 38.89 KG/M2

## 2025-04-17 DIAGNOSIS — E78.5 HYPERLIPIDEMIA, UNSPECIFIED HYPERLIPIDEMIA TYPE: ICD-10-CM

## 2025-04-17 DIAGNOSIS — D36.9 TUBULAR ADENOMA: ICD-10-CM

## 2025-04-17 DIAGNOSIS — E78.2 MIXED HYPERLIPIDEMIA: ICD-10-CM

## 2025-04-17 DIAGNOSIS — E66.01 CLASS 2 SEVERE OBESITY DUE TO EXCESS CALORIES WITH SERIOUS COMORBIDITY AND BODY MASS INDEX (BMI) OF 37.0 TO 37.9 IN ADULT: ICD-10-CM

## 2025-04-17 DIAGNOSIS — Z91.89 AT RISK FOR OBSTRUCTIVE SLEEP APNEA: ICD-10-CM

## 2025-04-17 DIAGNOSIS — K21.9 GASTROESOPHAGEAL REFLUX DISEASE WITHOUT ESOPHAGITIS: ICD-10-CM

## 2025-04-17 DIAGNOSIS — E66.812 CLASS 2 SEVERE OBESITY DUE TO EXCESS CALORIES WITH SERIOUS COMORBIDITY AND BODY MASS INDEX (BMI) OF 37.0 TO 37.9 IN ADULT: ICD-10-CM

## 2025-04-17 DIAGNOSIS — I10 BENIGN ESSENTIAL HYPERTENSION: Primary | ICD-10-CM

## 2025-04-17 PROCEDURE — 99212 OFFICE O/P EST SF 10 MIN: CPT | Performed by: INTERNAL MEDICINE

## 2025-04-17 PROCEDURE — 3079F DIAST BP 80-89 MM HG: CPT | Performed by: INTERNAL MEDICINE

## 2025-04-17 PROCEDURE — 3008F BODY MASS INDEX DOCD: CPT | Performed by: INTERNAL MEDICINE

## 2025-04-17 PROCEDURE — 3075F SYST BP GE 130 - 139MM HG: CPT | Performed by: INTERNAL MEDICINE

## 2025-04-17 ASSESSMENT — ENCOUNTER SYMPTOMS
HYPERTENSION: 1
HEADACHES: 0
SHORTNESS OF BREATH: 0
SWEATS: 0
BLURRED VISION: 0
PALPITATIONS: 0

## 2025-04-17 NOTE — PROGRESS NOTES
"Subjective   Giovani Spaulding is a 64 y.o. male who presents for Hypertension.      Hypertension  This is a chronic problem. The problem is controlled. Pertinent negatives include no blurred vision, chest pain, headaches, palpitations, peripheral edema, shortness of breath or sweats. Past treatments include beta blockers and ACE inhibitors. The current treatment provides significant improvement.       Review of Systems   Eyes:  Negative for blurred vision.   Respiratory:  Negative for shortness of breath.    Cardiovascular:  Negative for chest pain and palpitations.   Neurological:  Negative for headaches.       Objective   /84   Pulse 72   Temp 36.4 °C (97.6 °F)   Resp 18   Ht 1.88 m (6' 2\")   Wt 137 kg (303 lb)   BMI 38.90 kg/m²       Physical Exam  Constitutional:       Appearance: Normal appearance.   HENT:      Head: Normocephalic.   Eyes:      Conjunctiva/sclera: Conjunctivae normal.   Cardiovascular:      Rate and Rhythm: Normal rate and regular rhythm.      Heart sounds: Normal heart sounds.   Pulmonary:      Effort: Pulmonary effort is normal.      Breath sounds: Normal breath sounds.   Musculoskeletal:      Cervical back: Neck supple.   Skin:     General: Skin is warm and dry.   Neurological:      Mental Status: He is alert.         Assessment & Plan  Benign essential hypertension    Orders:    Basic Metabolic Panel; Future    lisinopril 40 mg tablet; Take 1 tablet (40 mg) by mouth once daily. Needs appointment to establish care with new primary for future refills    atenolol (Tenormin) 50 mg tablet; Take 1 tablet (50 mg) by mouth 2 times a day.    Mixed hyperlipidemia         Class 2 severe obesity due to excess calories with serious comorbidity and body mass index (BMI) of 37.0 to 37.9 in adult  Just not as consitent but summer helps.          At risk for obstructive sleep apnea  Discussed risks and possible sleep apnea but not interested in pursuing at this time.         Tubular adenoma     "     Gastroesophageal reflux disease without esophagitis    Orders:    esomeprazole (NexIUM) 40 mg DR capsule; Take 1 capsule (40 mg) by mouth once daily. Do not open capsule.    Hyperlipidemia, unspecified hyperlipidemia type    Orders:    atorvastatin (Lipitor) 20 mg tablet; Take 1 tablet (20 mg) by mouth once daily at bedtime. as directed         Johnathon Alejo, DO

## 2025-04-18 LAB
ANION GAP SERPL CALCULATED.4IONS-SCNC: 10 MMOL/L (CALC) (ref 7–17)
BUN SERPL-MCNC: 8 MG/DL (ref 7–25)
BUN/CREAT SERPL: ABNORMAL (CALC) (ref 6–22)
CALCIUM SERPL-MCNC: 9 MG/DL (ref 8.6–10.3)
CHLORIDE SERPL-SCNC: 104 MMOL/L (ref 98–110)
CO2 SERPL-SCNC: 27 MMOL/L (ref 20–32)
CREAT SERPL-MCNC: 0.86 MG/DL (ref 0.7–1.35)
EGFRCR SERPLBLD CKD-EPI 2021: 97 ML/MIN/1.73M2
GLUCOSE SERPL-MCNC: 106 MG/DL (ref 65–99)
POTASSIUM SERPL-SCNC: 4.9 MMOL/L (ref 3.5–5.3)
SODIUM SERPL-SCNC: 141 MMOL/L (ref 135–146)

## 2025-04-20 RX ORDER — ESOMEPRAZOLE MAGNESIUM 40 MG/1
40 CAPSULE, DELAYED RELEASE ORAL DAILY
Qty: 90 CAPSULE | Refills: 3 | Status: SHIPPED | OUTPATIENT
Start: 2025-04-20

## 2025-04-20 RX ORDER — LISINOPRIL 40 MG/1
40 TABLET ORAL DAILY
Qty: 90 TABLET | Refills: 3 | Status: SHIPPED | OUTPATIENT
Start: 2025-04-20 | End: 2026-04-20

## 2025-04-20 RX ORDER — ATENOLOL 50 MG/1
50 TABLET ORAL 2 TIMES DAILY
Qty: 180 TABLET | Refills: 3 | Status: SHIPPED | OUTPATIENT
Start: 2025-04-20

## 2025-04-20 RX ORDER — ATORVASTATIN CALCIUM 20 MG/1
20 TABLET, FILM COATED ORAL NIGHTLY
Qty: 90 TABLET | Refills: 3 | Status: SHIPPED | OUTPATIENT
Start: 2025-04-20 | End: 2026-04-20

## 2025-04-20 NOTE — ASSESSMENT & PLAN NOTE
Orders:    Basic Metabolic Panel; Future    lisinopril 40 mg tablet; Take 1 tablet (40 mg) by mouth once daily. Needs appointment to establish care with new primary for future refills    atenolol (Tenormin) 50 mg tablet; Take 1 tablet (50 mg) by mouth 2 times a day.

## 2025-08-06 ENCOUNTER — APPOINTMENT (OUTPATIENT)
Dept: RADIOLOGY | Facility: HOSPITAL | Age: 65
End: 2025-08-06
Payer: COMMERCIAL

## 2025-08-06 ENCOUNTER — APPOINTMENT (OUTPATIENT)
Dept: CARDIOLOGY | Facility: HOSPITAL | Age: 65
End: 2025-08-06
Payer: COMMERCIAL

## 2025-08-06 ENCOUNTER — HOSPITAL ENCOUNTER (EMERGENCY)
Facility: HOSPITAL | Age: 65
Discharge: HOME | End: 2025-08-06
Attending: STUDENT IN AN ORGANIZED HEALTH CARE EDUCATION/TRAINING PROGRAM
Payer: COMMERCIAL

## 2025-08-06 VITALS
OXYGEN SATURATION: 97 % | WEIGHT: 295 LBS | RESPIRATION RATE: 17 BRPM | BODY MASS INDEX: 36.68 KG/M2 | TEMPERATURE: 96.4 F | SYSTOLIC BLOOD PRESSURE: 168 MMHG | HEART RATE: 67 BPM | HEIGHT: 75 IN | DIASTOLIC BLOOD PRESSURE: 95 MMHG

## 2025-08-06 DIAGNOSIS — K85.20 ALCOHOL-INDUCED ACUTE PANCREATITIS, UNSPECIFIED COMPLICATION STATUS (HHS-HCC): Primary | ICD-10-CM

## 2025-08-06 DIAGNOSIS — R10.9 ABDOMINAL PAIN, UNSPECIFIED ABDOMINAL LOCATION: ICD-10-CM

## 2025-08-06 LAB
ALBUMIN SERPL BCP-MCNC: 4.1 G/DL (ref 3.4–5)
ALP SERPL-CCNC: 48 U/L (ref 33–136)
ALT SERPL W P-5'-P-CCNC: 17 U/L (ref 10–52)
ANION GAP SERPL CALC-SCNC: 10 MMOL/L (ref 10–20)
AST SERPL W P-5'-P-CCNC: 14 U/L (ref 9–39)
ATRIAL RATE: 62 BPM
BASOPHILS # BLD AUTO: 0.05 X10*3/UL (ref 0–0.1)
BASOPHILS NFR BLD AUTO: 0.3 %
BILIRUB SERPL-MCNC: 1.3 MG/DL (ref 0–1.2)
BUN SERPL-MCNC: 5 MG/DL (ref 6–23)
CALCIUM SERPL-MCNC: 9.3 MG/DL (ref 8.6–10.3)
CHLORIDE SERPL-SCNC: 100 MMOL/L (ref 98–107)
CO2 SERPL-SCNC: 30 MMOL/L (ref 21–32)
CREAT SERPL-MCNC: 0.8 MG/DL (ref 0.5–1.3)
EGFRCR SERPLBLD CKD-EPI 2021: >90 ML/MIN/1.73M*2
EOSINOPHIL # BLD AUTO: 0.26 X10*3/UL (ref 0–0.7)
EOSINOPHIL NFR BLD AUTO: 1.7 %
ERYTHROCYTE [DISTWIDTH] IN BLOOD BY AUTOMATED COUNT: 12.7 % (ref 11.5–14.5)
GLUCOSE SERPL-MCNC: 111 MG/DL (ref 74–99)
HCT VFR BLD AUTO: 44.2 % (ref 41–52)
HGB BLD-MCNC: 15.2 G/DL (ref 13.5–17.5)
IMM GRANULOCYTES # BLD AUTO: 0.06 X10*3/UL (ref 0–0.7)
IMM GRANULOCYTES NFR BLD AUTO: 0.4 % (ref 0–0.9)
LIPASE SERPL-CCNC: 119 U/L (ref 9–82)
LYMPHOCYTES # BLD AUTO: 1.16 X10*3/UL (ref 1.2–4.8)
LYMPHOCYTES NFR BLD AUTO: 7.7 %
MCH RBC QN AUTO: 31 PG (ref 26–34)
MCHC RBC AUTO-ENTMCNC: 34.4 G/DL (ref 32–36)
MCV RBC AUTO: 90 FL (ref 80–100)
MONOCYTES # BLD AUTO: 1.32 X10*3/UL (ref 0.1–1)
MONOCYTES NFR BLD AUTO: 8.8 %
NEUTROPHILS # BLD AUTO: 12.12 X10*3/UL (ref 1.2–7.7)
NEUTROPHILS NFR BLD AUTO: 81.1 %
NRBC BLD-RTO: 0 /100 WBCS (ref 0–0)
P AXIS: 78 DEGREES
P OFFSET: 132 MS
P ONSET: 83 MS
PLATELET # BLD AUTO: 208 X10*3/UL (ref 150–450)
POTASSIUM SERPL-SCNC: 4.1 MMOL/L (ref 3.5–5.3)
PR INTERVAL: 250 MS
PROT SERPL-MCNC: 7.1 G/DL (ref 6.4–8.2)
Q ONSET: 208 MS
QRS COUNT: 10 BEATS
QRS DURATION: 94 MS
QT INTERVAL: 456 MS
QTC CALCULATION(BAZETT): 462 MS
QTC FREDERICIA: 461 MS
R AXIS: 6 DEGREES
RBC # BLD AUTO: 4.9 X10*6/UL (ref 4.5–5.9)
SODIUM SERPL-SCNC: 136 MMOL/L (ref 136–145)
T AXIS: 1 DEGREES
T OFFSET: 436 MS
VENTRICULAR RATE: 62 BPM
WBC # BLD AUTO: 15 X10*3/UL (ref 4.4–11.3)

## 2025-08-06 PROCEDURE — 93005 ELECTROCARDIOGRAM TRACING: CPT

## 2025-08-06 PROCEDURE — 96375 TX/PRO/DX INJ NEW DRUG ADDON: CPT

## 2025-08-06 PROCEDURE — 99285 EMERGENCY DEPT VISIT HI MDM: CPT | Mod: 25 | Performed by: STUDENT IN AN ORGANIZED HEALTH CARE EDUCATION/TRAINING PROGRAM

## 2025-08-06 PROCEDURE — 36415 COLL VENOUS BLD VENIPUNCTURE: CPT

## 2025-08-06 PROCEDURE — 74176 CT ABD & PELVIS W/O CONTRAST: CPT | Performed by: RADIOLOGY

## 2025-08-06 PROCEDURE — 74176 CT ABD & PELVIS W/O CONTRAST: CPT

## 2025-08-06 PROCEDURE — 80053 COMPREHEN METABOLIC PANEL: CPT

## 2025-08-06 PROCEDURE — 85025 COMPLETE CBC W/AUTO DIFF WBC: CPT

## 2025-08-06 PROCEDURE — 83690 ASSAY OF LIPASE: CPT

## 2025-08-06 PROCEDURE — 96361 HYDRATE IV INFUSION ADD-ON: CPT

## 2025-08-06 PROCEDURE — 2500000004 HC RX 250 GENERAL PHARMACY W/ HCPCS (ALT 636 FOR OP/ED)

## 2025-08-06 PROCEDURE — 96374 THER/PROPH/DIAG INJ IV PUSH: CPT

## 2025-08-06 RX ORDER — ACETAMINOPHEN 500 MG
1000 TABLET ORAL EVERY 8 HOURS PRN
Qty: 30 TABLET | Refills: 0 | Status: SHIPPED | OUTPATIENT
Start: 2025-08-06 | End: 2025-08-16

## 2025-08-06 RX ORDER — ONDANSETRON HYDROCHLORIDE 2 MG/ML
4 INJECTION, SOLUTION INTRAVENOUS ONCE
Status: COMPLETED | OUTPATIENT
Start: 2025-08-06 | End: 2025-08-06

## 2025-08-06 RX ORDER — IBUPROFEN 600 MG/1
600 TABLET, FILM COATED ORAL EVERY 6 HOURS PRN
Qty: 30 TABLET | Refills: 0 | Status: SHIPPED | OUTPATIENT
Start: 2025-08-06

## 2025-08-06 RX ORDER — MORPHINE SULFATE 4 MG/ML
4 INJECTION, SOLUTION INTRAMUSCULAR; INTRAVENOUS ONCE
Status: COMPLETED | OUTPATIENT
Start: 2025-08-06 | End: 2025-08-06

## 2025-08-06 RX ORDER — OXYCODONE HYDROCHLORIDE 5 MG/1
5 TABLET ORAL EVERY 8 HOURS PRN
Qty: 9 TABLET | Refills: 0 | Status: SHIPPED | OUTPATIENT
Start: 2025-08-06 | End: 2025-08-09

## 2025-08-06 RX ORDER — ONDANSETRON 4 MG/1
4 TABLET, ORALLY DISINTEGRATING ORAL EVERY 8 HOURS PRN
Qty: 20 TABLET | Refills: 0 | Status: SHIPPED | OUTPATIENT
Start: 2025-08-06

## 2025-08-06 RX ADMIN — MORPHINE SULFATE 4 MG: 4 INJECTION, SOLUTION INTRAMUSCULAR; INTRAVENOUS at 10:53

## 2025-08-06 RX ADMIN — ONDANSETRON 4 MG: 2 INJECTION, SOLUTION INTRAMUSCULAR; INTRAVENOUS at 10:53

## 2025-08-06 RX ADMIN — SODIUM CHLORIDE, SODIUM LACTATE, POTASSIUM CHLORIDE, AND CALCIUM CHLORIDE 1000 ML: .6; .31; .03; .02 INJECTION, SOLUTION INTRAVENOUS at 10:55

## 2025-08-06 ASSESSMENT — LIFESTYLE VARIABLES
EVER HAD A DRINK FIRST THING IN THE MORNING TO STEADY YOUR NERVES TO GET RID OF A HANGOVER: NO
HAVE YOU EVER FELT YOU SHOULD CUT DOWN ON YOUR DRINKING: NO
HAVE PEOPLE ANNOYED YOU BY CRITICIZING YOUR DRINKING: NO
EVER FELT BAD OR GUILTY ABOUT YOUR DRINKING: NO
TOTAL SCORE: 0

## 2025-08-06 ASSESSMENT — PAIN DESCRIPTION - PAIN TYPE: TYPE: ACUTE PAIN

## 2025-08-06 ASSESSMENT — PAIN SCALES - GENERAL
PAINLEVEL_OUTOF10: 6
PAINLEVEL_OUTOF10: 5 - MODERATE PAIN
PAINLEVEL_OUTOF10: 4

## 2025-08-06 ASSESSMENT — PAIN DESCRIPTION - LOCATION
LOCATION: ABDOMEN
LOCATION: ABDOMEN

## 2025-08-06 ASSESSMENT — PAIN DESCRIPTION - ORIENTATION: ORIENTATION: RIGHT;LOWER

## 2025-08-06 ASSESSMENT — PAIN DESCRIPTION - DESCRIPTORS
DESCRIPTORS: ACHING
DESCRIPTORS: ACHING

## 2025-08-06 ASSESSMENT — PAIN - FUNCTIONAL ASSESSMENT
PAIN_FUNCTIONAL_ASSESSMENT: 0-10

## 2025-08-06 ASSESSMENT — PAIN DESCRIPTION - PROGRESSION: CLINICAL_PROGRESSION: GRADUALLY IMPROVING

## 2025-08-06 NOTE — Clinical Note
Giovani Spaulding was seen and treated in our emergency department on 8/6/2025.  He may return to work on 08/11/2025.       If you have any questions or concerns, please don't hesitate to call.      Nikki Auguste PA-C

## 2025-08-06 NOTE — ED PROVIDER NOTES
HPI   Chief Complaint   Patient presents with    Abdominal Pain       Patient is a 64-year-old male with PMH DVT, HTN, fatty liver disease, GERD, HLD, PUD, pancreatitis presenting today for abdominal pain.  Was hospitalized in January of this year for pancreatitis.  Reports that for many months, he quit drinking the over the weekend he was out boating with his friends and had a few alcoholic beverages.  Starting 3 days ago, developed abdominal pain described as right upper and right lower abdominal pain.  He feels as if his abdomen is distended and more round.  Notes that the pain has been progressively worsening.  Endorses chills without fever.  Endorsing nausea without vomiting.  States that he has a decreased appetite and notes that he has been more constipated in nature.  Reports 1 prior surgeries to his abdomen many years ago for hernia.              Patient History   Medical History[1]  Surgical History[2]  Family History[3]  Social History[4]    Physical Exam   ED Triage Vitals [08/06/25 0913]   Temperature Heart Rate Respirations BP   35.8 °C (96.4 °F) 65 18 (!) 179/94      Pulse Ox Temp Source Heart Rate Source Patient Position   96 % Temporal Monitor Sitting      BP Location FiO2 (%)     Right arm --       Physical Exam  Vitals and nursing note reviewed.   Constitutional:       General: He is not in acute distress.     Appearance: Normal appearance. He is diaphoretic. He is not ill-appearing.   HENT:      Head: Normocephalic and atraumatic.      Right Ear: External ear normal.      Left Ear: External ear normal.      Nose: Nose normal.      Mouth/Throat:      Mouth: Mucous membranes are moist.     Eyes:      Extraocular Movements: Extraocular movements intact.      Conjunctiva/sclera: Conjunctivae normal.      Pupils: Pupils are equal, round, and reactive to light.       Cardiovascular:      Rate and Rhythm: Normal rate and regular rhythm.      Heart sounds: Normal heart sounds.   Pulmonary:      Effort:  No accessory muscle usage or respiratory distress.      Breath sounds: Normal breath sounds. No wheezing, rhonchi or rales.   Abdominal:      General: Abdomen is flat. Bowel sounds are normal. There is distension.      Palpations: Abdomen is soft.      Tenderness: There is abdominal tenderness in the right upper quadrant, right lower quadrant and epigastric area. There is guarding. There is no right CVA tenderness, left CVA tenderness or rebound. Negative signs include Johansen's sign.     Musculoskeletal:         General: No swelling or deformity. Normal range of motion.      Cervical back: Normal range of motion and neck supple.      Right lower leg: No edema.      Left lower leg: No edema.     Skin:     General: Skin is warm.      Capillary Refill: Capillary refill takes less than 2 seconds.     Neurological:      General: No focal deficit present.      Mental Status: He is alert and oriented to person, place, and time.      GCS: GCS eye subscore is 4. GCS verbal subscore is 5. GCS motor subscore is 6.      Cranial Nerves: Cranial nerves 2-12 are intact.      Sensory: No sensory deficit.      Motor: Motor function is intact. No weakness.     Psychiatric:         Mood and Affect: Mood and affect normal.         Speech: Speech normal.         Behavior: Behavior normal. Behavior is cooperative.           ED Course & MDM   ED Course as of 08/06/25 1415   Wed Aug 06, 2025   1109 WBC(!): 15.0 [ML]   1138 CT abdomen pelvis wo IV contrast  1. Mild inflammatory changes about the pancreatic head suggestive of  acute pancreatitis.   [ML]      ED Course User Index  [ML] Nikki Auguste PA-C         Diagnoses as of 08/06/25 1415   Abdominal pain, unspecified abdominal location   Alcohol-induced acute pancreatitis, unspecified complication status ([facility])                 No data recorded     [place] Coma Scale Score: 15 (08/06/25 0917 : Jn Bautista RN)                           Medical Decision Making  Patient is a 64 old  male presenting today for abdominal pain.  Ongoing for the last few days after he drink alcohol over the weekend.  Does have a history of pancreatitis but notes that he quit drinking for many months.  Pain is described as right upper abdominal pain and right lower abdominal pain.  States that when he had prior pancreatitis flare, he was experiencing similar pain.  On exam, he does have tenderness to the right upper and right lower abdomen, minimal epigastric abdominal pain.  He has no urinary symptoms, no CVA tenderness.  Low suspicion for pyelonephritis or nephrolithiasis.  Negative Johansen sign.  On arrival he is afebrile, no vitals meeting SIRS criteria.  He is mildly hypertensive with a BP of 179/94, likely secondary to pain.  He is mildly diaphoretic.  Denies any chest pain or shortness of breath.  Will get a CT of the abdomen pelvis, basic labs.  Will treat symptomatically with Zofran, morphine and IV fluids.  Patient does have an IV contrast allergy addition to prednisone allergy so unable to give him IV contrast and unable to premedicate him to give him the IV contrast.  Will plan to do dry CT scan without IV contrast due to allergy restrictions.    Patient does have a white count of 15.  No acute anemia.  CMP showing no electrolyte disturbances, ZOE or transaminitis.  Lipase is mildly elevated at 119. CT abdomen pelvis revealing mild inflammatory changes about the pancreatic head suggestive of acute pancreatitis.  Discussed results with patient.  On reevaluation, he states he is feeling much better.  Considering this is uncomplicated pancreatitis, discussed home-going with symptom management.  He is comfortable with this.  He was able to pass p.o. challenge and started on a diet of saltine crackers.  Plan for patient home-going with prescriptions for ibuprofen, Tylenol, oxycodone for breakthrough pain, Zofran.  Discussed increased intake of oral fluids including water and electrolyte in addition to gradual  advancing diet.  Patient was given referral to GI.  Plan for him to follow-up with PCP as well.  Discussed return precautions.        Procedure  Procedures       [1]   Past Medical History:  Diagnosis Date    Hypertension    [2]   Past Surgical History:  Procedure Laterality Date    CATARACT EXTRACTION Right     OTHER SURGICAL HISTORY  10/02/2019    Laminectomy    OTHER SURGICAL HISTORY  10/02/2019    Vasectomy   [3]   Family History  Problem Relation Name Age of Onset    No Known Problems Mother      No Known Problems Father     [4]   Social History  Tobacco Use    Smoking status: Never    Smokeless tobacco: Never   Vaping Use    Vaping status: Never Used   Substance Use Topics    Alcohol use: Not Currently     Comment: Recently quit drinking 5/26/2024    Drug use: Never        Nikki Auguste PA-C  08/06/25 1427

## 2025-08-06 NOTE — DISCHARGE INSTRUCTIONS
Avoid drinking alcohol as this will make your symptoms worsen  Can rotate between ibuprofen and Tylenol and take oxycodone for breakthrough pain.  Sending in Zofran as needed for nausea  I encourage increased oral rehydration including water and electrolyte drinks, start on easy foods on your stomach for diet and progress over the next few days  Return to the emergency department if pain worsens, you develop intractable nausea vomiting, fevers or any other concerning symptoms

## 2025-08-06 NOTE — ED TRIAGE NOTES
Patient presents with RLQ ABD pain. Reports concern for pancreatitis as he was hospitalized earlier this year with same symptoms.

## 2025-08-14 DIAGNOSIS — K21.9 GASTROESOPHAGEAL REFLUX DISEASE WITHOUT ESOPHAGITIS: Primary | ICD-10-CM

## 2025-08-14 RX ORDER — ESOMEPRAZOLE MAGNESIUM 40 MG/1
40 CAPSULE, DELAYED RELEASE ORAL DAILY
Qty: 90 CAPSULE | Refills: 3 | Status: SHIPPED | OUTPATIENT
Start: 2025-08-14

## 2025-08-25 LAB
ATRIAL RATE: 62 BPM
P AXIS: 78 DEGREES
P OFFSET: 132 MS
P ONSET: 83 MS
PR INTERVAL: 250 MS
Q ONSET: 208 MS
QRS COUNT: 10 BEATS
QRS DURATION: 94 MS
QT INTERVAL: 456 MS
QTC CALCULATION(BAZETT): 462 MS
QTC FREDERICIA: 461 MS
R AXIS: 6 DEGREES
T AXIS: 1 DEGREES
T OFFSET: 436 MS
VENTRICULAR RATE: 62 BPM

## 2025-08-26 ENCOUNTER — TELEPHONE (OUTPATIENT)
Dept: PRIMARY CARE | Facility: CLINIC | Age: 65
End: 2025-08-26
Payer: COMMERCIAL

## 2025-09-18 ENCOUNTER — APPOINTMENT (OUTPATIENT)
Dept: PRIMARY CARE | Facility: CLINIC | Age: 65
End: 2025-09-18
Payer: COMMERCIAL